# Patient Record
Sex: FEMALE | Race: WHITE | NOT HISPANIC OR LATINO | Employment: OTHER | URBAN - METROPOLITAN AREA
[De-identification: names, ages, dates, MRNs, and addresses within clinical notes are randomized per-mention and may not be internally consistent; named-entity substitution may affect disease eponyms.]

---

## 2023-06-16 ENCOUNTER — OFFICE VISIT (OUTPATIENT)
Dept: PRIMARY CARE | Facility: CLINIC | Age: 68
End: 2023-06-16
Payer: MEDICARE

## 2023-06-16 VITALS — SYSTOLIC BLOOD PRESSURE: 140 MMHG | WEIGHT: 149 LBS | BODY MASS INDEX: 25.58 KG/M2 | DIASTOLIC BLOOD PRESSURE: 100 MMHG

## 2023-06-16 DIAGNOSIS — I10 HYPERTENSION, UNSPECIFIED TYPE: Primary | ICD-10-CM

## 2023-06-16 DIAGNOSIS — J30.2 SEASONAL ALLERGIES: ICD-10-CM

## 2023-06-16 PROBLEM — R92.8 ABNORMAL MAMMOGRAM: Status: ACTIVE | Noted: 2023-06-16

## 2023-06-16 PROBLEM — J30.9 ALLERGIC RHINITIS: Status: ACTIVE | Noted: 2023-06-16

## 2023-06-16 PROBLEM — R42 DIZZINESS: Status: ACTIVE | Noted: 2023-06-16

## 2023-06-16 PROBLEM — R21 RASH: Status: ACTIVE | Noted: 2023-06-16

## 2023-06-16 PROCEDURE — 3077F SYST BP >= 140 MM HG: CPT | Performed by: STUDENT IN AN ORGANIZED HEALTH CARE EDUCATION/TRAINING PROGRAM

## 2023-06-16 PROCEDURE — 1159F MED LIST DOCD IN RCRD: CPT | Performed by: STUDENT IN AN ORGANIZED HEALTH CARE EDUCATION/TRAINING PROGRAM

## 2023-06-16 PROCEDURE — 3080F DIAST BP >= 90 MM HG: CPT | Performed by: STUDENT IN AN ORGANIZED HEALTH CARE EDUCATION/TRAINING PROGRAM

## 2023-06-16 PROCEDURE — 99213 OFFICE O/P EST LOW 20 MIN: CPT | Performed by: STUDENT IN AN ORGANIZED HEALTH CARE EDUCATION/TRAINING PROGRAM

## 2023-06-16 PROCEDURE — 1160F RVW MEDS BY RX/DR IN RCRD: CPT | Performed by: STUDENT IN AN ORGANIZED HEALTH CARE EDUCATION/TRAINING PROGRAM

## 2023-06-16 RX ORDER — LORATADINE 10 MG/1
10 TABLET ORAL DAILY PRN
Qty: 90 TABLET | Refills: 1 | Status: SHIPPED | OUTPATIENT
Start: 2023-06-16 | End: 2023-10-06 | Stop reason: SDUPTHER

## 2023-06-16 RX ORDER — LISINOPRIL 40 MG/1
40 TABLET ORAL DAILY
Qty: 90 TABLET | Refills: 1 | Status: SHIPPED | OUTPATIENT
Start: 2023-06-16 | End: 2023-12-15 | Stop reason: SDUPTHER

## 2023-06-16 RX ORDER — LISINOPRIL 40 MG/1
40 TABLET ORAL DAILY
COMMUNITY
End: 2023-06-16 | Stop reason: SDUPTHER

## 2023-06-16 RX ORDER — AMLODIPINE BESYLATE 2.5 MG/1
1 TABLET ORAL DAILY
COMMUNITY
Start: 2022-02-15 | End: 2023-12-14 | Stop reason: SDUPTHER

## 2023-06-16 RX ORDER — CLOBETASOL PROPIONATE 0.5 MG/G
CREAM TOPICAL
COMMUNITY
End: 2023-12-14 | Stop reason: SDUPTHER

## 2023-06-16 RX ORDER — IBUPROFEN AND DIPHENHYDRAMINE CITRATE 200; 38 MG/1; MG/1
TABLET, COATED ORAL
COMMUNITY
End: 2023-12-15 | Stop reason: ALTCHOICE

## 2023-06-16 RX ORDER — NYSTATIN 100000 [USP'U]/ML
SUSPENSION ORAL
COMMUNITY
Start: 2023-03-29 | End: 2023-12-15 | Stop reason: ALTCHOICE

## 2023-06-16 RX ORDER — TERBINAFINE HYDROCHLORIDE 250 MG/1
TABLET ORAL
COMMUNITY
Start: 2022-09-19 | End: 2023-12-15 | Stop reason: ALTCHOICE

## 2023-06-16 RX ORDER — LORATADINE 10 MG/1
10 TABLET ORAL DAILY PRN
COMMUNITY
End: 2023-06-16 | Stop reason: SDUPTHER

## 2023-06-16 RX ORDER — CLOBETASOL PROPIONATE 0.46 MG/ML
SOLUTION TOPICAL
COMMUNITY
Start: 2022-02-01

## 2023-06-16 RX ORDER — TRIAMCINOLONE ACETONIDE 5 MG/G
CREAM TOPICAL
COMMUNITY
Start: 2020-01-23 | End: 2023-12-15 | Stop reason: ALTCHOICE

## 2023-06-16 RX ORDER — BETAMETHASONE VALERATE 1 MG/G
CREAM TOPICAL
COMMUNITY
End: 2023-10-29

## 2023-06-16 RX ORDER — IBUPROFEN 400 MG/1
400 TABLET ORAL
COMMUNITY
End: 2024-04-03 | Stop reason: ALTCHOICE

## 2023-06-16 RX ORDER — FLUTICASONE PROPIONATE 50 MCG
SPRAY, SUSPENSION (ML) NASAL
COMMUNITY
Start: 2018-09-11 | End: 2024-04-06 | Stop reason: HOSPADM

## 2023-06-16 NOTE — PROGRESS NOTES
Follow up and med refill    Subjective   Patient ID: Toma Lane is a 67 y.o. female who presents for Follow-up and Med Refill.    HPI presents for follow-up medication refill.  Overall has been doing well.  Had a plan with her dermatologist earlier this week.  Has also had some dental work done including new dentures on the bottom and new fittings for the top.    Review of Systems  8 point review of systems is otherwise negative unless mentioned on HPI      Objective   BP (!) 140/100   Wt 67.6 kg (149 lb)   BMI 25.58 kg/m²     Physical Exam  General: No acute distress  HEENT: EOMI  CV: Regular rate and rhythm, normal S1 and S2, no murmurs  Pulm: Clear to auscultation bilaterally, no wheezings, rales or rhonchi  Abd: Nondistended  MSK: areas of erythema with plaque on forearms and legs  Skin: No rashes   Lymphatic: No lymphadenopathy      Assessment/Plan       #HTN  -Continue lisinopril 40mg daily  -Continue amlodipine 2.5mg daily, discussed may need to increase at next visit if blood pressure remains little bit high, to start monitoring blood pressure again at home.     #Plaque psoriasis  -Follows with dermatology, was initially started on Otezla that was discontinued for side effects, had been considered for injectable medications, were cost prohibitive. Currently on 2 different steroid creams that are alternated every 2 weeks. Has had some improvement     #Raynaud's  -Has had some improvement since starting amlodipine, has noted some LE edema although has not been bothersome enough to stop the amlodipine     #H/O hand numbness with positive phalen's test  -Offered to order EMG when is more bothersome      #Tobacco use  -Discussed decreasing/quitting, still working on decreasing use over time     #Rhinorrhea  -Continue flonase and loratadine.      #Health maintenance  -Last colonoscopy at age 58, told no issues and due to repeat at 68  -Mammogram/ultrasound 2/2023  -Follows with GYN  -Received Covid vaccine  and booster  -Previously recommended Pneumovax, will consider  -Recommended shingrix  -Routine labs ordered     RTC 6 months      This note was dictated by speech recognition. Minor errors in transcription may be present.

## 2023-10-06 DIAGNOSIS — J30.2 SEASONAL ALLERGIES: ICD-10-CM

## 2023-10-06 RX ORDER — LORATADINE 10 MG/1
10 TABLET ORAL DAILY PRN
Qty: 90 TABLET | Refills: 0 | Status: SHIPPED | OUTPATIENT
Start: 2023-10-06 | End: 2023-12-15 | Stop reason: SDUPTHER

## 2023-10-28 DIAGNOSIS — L40.0 PLAQUE PSORIASIS: Primary | ICD-10-CM

## 2023-10-29 RX ORDER — BETAMETHASONE VALERATE 1 MG/G
CREAM TOPICAL
Qty: 45 G | Refills: 3 | Status: SHIPPED | OUTPATIENT
Start: 2023-10-29 | End: 2023-12-14 | Stop reason: SDUPTHER

## 2023-11-28 DIAGNOSIS — L21.9 SEBORRHEIC DERMATITIS: ICD-10-CM

## 2023-12-10 RX ORDER — CLOBETASOL PROPIONATE 0.5 MG/G
CREAM TOPICAL
Qty: 60 G | Refills: 3 | OUTPATIENT
Start: 2023-12-10

## 2023-12-11 ENCOUNTER — APPOINTMENT (OUTPATIENT)
Dept: DERMATOLOGY | Facility: CLINIC | Age: 68
End: 2023-12-11
Payer: MEDICARE

## 2023-12-14 ENCOUNTER — OFFICE VISIT (OUTPATIENT)
Dept: DERMATOLOGY | Facility: CLINIC | Age: 68
End: 2023-12-14
Payer: MEDICARE

## 2023-12-14 DIAGNOSIS — L40.0 PLAQUE PSORIASIS: ICD-10-CM

## 2023-12-14 PROBLEM — L72.11 PILAR CYST: Status: ACTIVE | Noted: 2023-06-12

## 2023-12-14 PROBLEM — D48.5 NEOPLASM OF UNCERTAIN BEHAVIOR OF SKIN: Status: ACTIVE | Noted: 2023-06-12

## 2023-12-14 PROBLEM — L65.0 TELOGEN EFFLUVIUM: Status: ACTIVE | Noted: 2023-06-12

## 2023-12-14 PROBLEM — B35.1 TINEA UNGUIUM: Status: ACTIVE | Noted: 2023-06-12

## 2023-12-14 PROBLEM — L28.0 LICHEN SIMPLEX CHRONICUS: Status: ACTIVE | Noted: 2023-06-12

## 2023-12-14 PROCEDURE — 99212 OFFICE O/P EST SF 10 MIN: CPT | Performed by: SPECIALIST

## 2023-12-14 PROCEDURE — 1159F MED LIST DOCD IN RCRD: CPT | Performed by: SPECIALIST

## 2023-12-14 PROCEDURE — 1160F RVW MEDS BY RX/DR IN RCRD: CPT | Performed by: SPECIALIST

## 2023-12-14 RX ORDER — LISINOPRIL 10 MG/1
10 TABLET ORAL
COMMUNITY
End: 2023-12-15 | Stop reason: ALTCHOICE

## 2023-12-14 RX ORDER — GUSELKUMAB 100 MG/ML
INJECTION SUBCUTANEOUS
COMMUNITY
Start: 2022-02-18 | End: 2023-12-15 | Stop reason: ALTCHOICE

## 2023-12-14 RX ORDER — CLOBETASOL PROPIONATE 0.5 MG/G
CREAM TOPICAL
Qty: 60 G | Refills: 1 | Status: SHIPPED | OUTPATIENT
Start: 2023-12-14

## 2023-12-14 RX ORDER — BETAMETHASONE VALERATE 1 MG/G
CREAM TOPICAL
Qty: 45 G | Refills: 3 | Status: SHIPPED | OUTPATIENT
Start: 2023-12-14

## 2023-12-14 RX ORDER — AMLODIPINE BESYLATE 2.5 MG/1
2.5 TABLET ORAL DAILY
Qty: 90 TABLET | Refills: 3 | Status: SHIPPED | OUTPATIENT
Start: 2023-12-14

## 2023-12-14 NOTE — PROGRESS NOTES
Subjective     Toma Lane is a 68 y.o. female who presents for the following: Follow-up (PSO /Taking Clobetasol Cream , Betamethasone Valerate/Amlodipine 2.5 every day).     Review of Systems:  No other skin or systemic complaints other than what is documented elsewhere in the note.    The following portions of the chart were reviewed this encounter and updated as appropriate:          Skin Cancer History  No skin cancer on file.      Specialty Problems          Dermatology Problems    Rash        Objective   Well appearing patient in no apparent distress; mood and affect are within normal limits.    A focused skin examination was performed. All findings within normal limits unless otherwise noted below.    Assessment/Plan   1. Plaque psoriasis    Related Medications  betamethasone valerate (Valisone) 0.1 % cream  APPLY TWICE A DAY TOPICALLY TO PLAQUES ON BODY

## 2023-12-14 NOTE — PROGRESS NOTES
Subjective   HPI: Toma Lane is a 68 y.o. female is here for evaluation and treatment of psoriasis and vascular spasms of her fingers..     ROS: No other skin or systemic complaints other than what is documented elsewhere in the note.    ALLERGIES: Patient has no known allergies.    SOCIAL:  reports that she has been smoking cigarettes. She uses smokeless tobacco. She reports current alcohol use. She reports that she does not use drugs.    Objective     Description: Patient psoriasis involving her hands is under excellent control today.  No complaints are offered regarding her fingers.    Assessment/Plan   1. Plaque psoriasis    Related Medications  betamethasone valerate (Valisone) 0.1 % cream  APPLY TWICE A DAY TOPICALLY TO PLAQUES ON BODY    amLODIPine (Norvasc) 2.5 mg tablet  Take 1 tablet (2.5 mg) by mouth once daily.    clobetasol (Temovate) 0.05 % cream  APPLY TWICE A DAY TOPICALLY TO ALL PLAQUE AREAS-USE FOR 2 WKS,THEN STEP DOWN TO BETAMETHASONE CREAM         FOLLOW UP: 6 months or sooner if necessary.    The patient was encouraged to contact me with any further questions or concerns.  Dima Hinkle MD  12/14/2023

## 2023-12-15 ENCOUNTER — OFFICE VISIT (OUTPATIENT)
Dept: PRIMARY CARE | Facility: CLINIC | Age: 68
End: 2023-12-15
Payer: MEDICARE

## 2023-12-15 ENCOUNTER — LAB (OUTPATIENT)
Dept: LAB | Facility: LAB | Age: 68
End: 2023-12-15
Payer: MEDICARE

## 2023-12-15 VITALS — WEIGHT: 150 LBS | BODY MASS INDEX: 25.75 KG/M2 | SYSTOLIC BLOOD PRESSURE: 131 MMHG | DIASTOLIC BLOOD PRESSURE: 85 MMHG

## 2023-12-15 DIAGNOSIS — Z12.11 SCREENING FOR COLORECTAL CANCER: ICD-10-CM

## 2023-12-15 DIAGNOSIS — Z00.00 HEALTHCARE MAINTENANCE: Primary | ICD-10-CM

## 2023-12-15 DIAGNOSIS — Z00.00 MEDICARE ANNUAL WELLNESS VISIT, SUBSEQUENT: ICD-10-CM

## 2023-12-15 DIAGNOSIS — Z12.12 SCREENING FOR COLORECTAL CANCER: ICD-10-CM

## 2023-12-15 DIAGNOSIS — I10 HYPERTENSION, UNSPECIFIED TYPE: ICD-10-CM

## 2023-12-15 DIAGNOSIS — Z00.00 HEALTHCARE MAINTENANCE: ICD-10-CM

## 2023-12-15 DIAGNOSIS — J30.2 SEASONAL ALLERGIES: ICD-10-CM

## 2023-12-15 DIAGNOSIS — Z00.00 ROUTINE GENERAL MEDICAL EXAMINATION AT HEALTH CARE FACILITY: ICD-10-CM

## 2023-12-15 LAB
ALBUMIN SERPL BCP-MCNC: 3.9 G/DL (ref 3.4–5)
ALP SERPL-CCNC: 88 U/L (ref 33–136)
ALT SERPL W P-5'-P-CCNC: 15 U/L (ref 7–45)
ANION GAP SERPL CALC-SCNC: 10 MMOL/L (ref 10–20)
AST SERPL W P-5'-P-CCNC: 19 U/L (ref 9–39)
BILIRUB SERPL-MCNC: 0.3 MG/DL (ref 0–1.2)
BUN SERPL-MCNC: 19 MG/DL (ref 6–23)
CALCIUM SERPL-MCNC: 10 MG/DL (ref 8.6–10.6)
CHLORIDE SERPL-SCNC: 106 MMOL/L (ref 98–107)
CHOLEST SERPL-MCNC: 176 MG/DL (ref 0–199)
CHOLESTEROL/HDL RATIO: 2.7
CO2 SERPL-SCNC: 31 MMOL/L (ref 21–32)
CREAT SERPL-MCNC: 1.2 MG/DL (ref 0.5–1.05)
ERYTHROCYTE [DISTWIDTH] IN BLOOD BY AUTOMATED COUNT: 14.5 % (ref 11.5–14.5)
GFR SERPL CREATININE-BSD FRML MDRD: 49 ML/MIN/1.73M*2
GLUCOSE SERPL-MCNC: 102 MG/DL (ref 74–99)
HCT VFR BLD AUTO: 39.6 % (ref 36–46)
HDLC SERPL-MCNC: 65.7 MG/DL
HGB BLD-MCNC: 12.8 G/DL (ref 12–16)
IRON SATN MFR SERPL: 22 % (ref 25–45)
IRON SERPL-MCNC: 74 UG/DL (ref 35–150)
LDLC SERPL CALC-MCNC: 94 MG/DL
MCH RBC QN AUTO: 32.9 PG (ref 26–34)
MCHC RBC AUTO-ENTMCNC: 32.3 G/DL (ref 32–36)
MCV RBC AUTO: 102 FL (ref 80–100)
NON HDL CHOLESTEROL: 110 MG/DL (ref 0–149)
NRBC BLD-RTO: 0 /100 WBCS (ref 0–0)
PLATELET # BLD AUTO: 454 X10*3/UL (ref 150–450)
POTASSIUM SERPL-SCNC: 4.3 MMOL/L (ref 3.5–5.3)
PROT SERPL-MCNC: 7.2 G/DL (ref 6.4–8.2)
RBC # BLD AUTO: 3.89 X10*6/UL (ref 4–5.2)
SODIUM SERPL-SCNC: 143 MMOL/L (ref 136–145)
TIBC SERPL-MCNC: 341 UG/DL (ref 240–445)
TRIGL SERPL-MCNC: 80 MG/DL (ref 0–149)
TSH SERPL-ACNC: 1.61 MIU/L (ref 0.44–3.98)
UIBC SERPL-MCNC: 267 UG/DL (ref 110–370)
VLDL: 16 MG/DL (ref 0–40)
WBC # BLD AUTO: 6.1 X10*3/UL (ref 4.4–11.3)

## 2023-12-15 PROCEDURE — 83550 IRON BINDING TEST: CPT

## 2023-12-15 PROCEDURE — 80061 LIPID PANEL: CPT

## 2023-12-15 PROCEDURE — 1159F MED LIST DOCD IN RCRD: CPT | Performed by: STUDENT IN AN ORGANIZED HEALTH CARE EDUCATION/TRAINING PROGRAM

## 2023-12-15 PROCEDURE — 1170F FXNL STATUS ASSESSED: CPT | Performed by: STUDENT IN AN ORGANIZED HEALTH CARE EDUCATION/TRAINING PROGRAM

## 2023-12-15 PROCEDURE — 85027 COMPLETE CBC AUTOMATED: CPT

## 2023-12-15 PROCEDURE — 80053 COMPREHEN METABOLIC PANEL: CPT

## 2023-12-15 PROCEDURE — 1160F RVW MEDS BY RX/DR IN RCRD: CPT | Performed by: STUDENT IN AN ORGANIZED HEALTH CARE EDUCATION/TRAINING PROGRAM

## 2023-12-15 PROCEDURE — 3079F DIAST BP 80-89 MM HG: CPT | Performed by: STUDENT IN AN ORGANIZED HEALTH CARE EDUCATION/TRAINING PROGRAM

## 2023-12-15 PROCEDURE — 36415 COLL VENOUS BLD VENIPUNCTURE: CPT

## 2023-12-15 PROCEDURE — 84443 ASSAY THYROID STIM HORMONE: CPT

## 2023-12-15 PROCEDURE — G0439 PPPS, SUBSEQ VISIT: HCPCS | Performed by: STUDENT IN AN ORGANIZED HEALTH CARE EDUCATION/TRAINING PROGRAM

## 2023-12-15 PROCEDURE — 99213 OFFICE O/P EST LOW 20 MIN: CPT | Performed by: STUDENT IN AN ORGANIZED HEALTH CARE EDUCATION/TRAINING PROGRAM

## 2023-12-15 PROCEDURE — 83540 ASSAY OF IRON: CPT

## 2023-12-15 PROCEDURE — 3075F SYST BP GE 130 - 139MM HG: CPT | Performed by: STUDENT IN AN ORGANIZED HEALTH CARE EDUCATION/TRAINING PROGRAM

## 2023-12-15 RX ORDER — LORATADINE 10 MG/1
10 TABLET ORAL DAILY PRN
Qty: 90 TABLET | Refills: 1 | Status: SHIPPED | OUTPATIENT
Start: 2023-12-15

## 2023-12-15 RX ORDER — LISINOPRIL 40 MG/1
40 TABLET ORAL DAILY
Qty: 90 TABLET | Refills: 1 | Status: SHIPPED | OUTPATIENT
Start: 2023-12-15

## 2023-12-15 ASSESSMENT — ACTIVITIES OF DAILY LIVING (ADL)
MANAGING_FINANCES: INDEPENDENT
GROCERY_SHOPPING: INDEPENDENT
BATHING: INDEPENDENT
DOING_HOUSEWORK: INDEPENDENT
DRESSING: INDEPENDENT
TAKING_MEDICATION: INDEPENDENT

## 2023-12-15 ASSESSMENT — PATIENT HEALTH QUESTIONNAIRE - PHQ9
2. FEELING DOWN, DEPRESSED OR HOPELESS: SEVERAL DAYS
1. LITTLE INTEREST OR PLEASURE IN DOING THINGS: NOT AT ALL
10. IF YOU CHECKED OFF ANY PROBLEMS, HOW DIFFICULT HAVE THESE PROBLEMS MADE IT FOR YOU TO DO YOUR WORK, TAKE CARE OF THINGS AT HOME, OR GET ALONG WITH OTHER PEOPLE: NOT DIFFICULT AT ALL
SUM OF ALL RESPONSES TO PHQ9 QUESTIONS 1 AND 2: 1

## 2023-12-15 NOTE — PROGRESS NOTES
Subjective   Reason for Visit: Toma Lane is an 68 y.o. female here for a Medicare Wellness visit.          Reviewed all medications by prescribing practitioner or clinical pharmacist (such as prescriptions, OTCs, herbal therapies and supplements) and documented in the medical record.    HPI    Presents for follow-up, Medicare wellness visit.  Reports has been doing well.  Has been going through limited increase in stress in regards to family dynamics.  Blood pressure today was 131/85, she was little bit surprised that it was in a good range given the recent increase in stress.  Stays active.    Patient Care Team:  William Simeon MD as PCP - General     Review of Systems    8 point review of systems is otherwise negative unless mentioned on HPI      Objective   Vitals:  /85   Wt 68 kg (150 lb)   BMI 25.75 kg/m²       Physical Exam    General: No acute distress  HEENT: EOMI  CV: Regular rate and rhythm, normal S1 and S2, no murmurs  Pulm: Clear to auscultation bilaterally, no wheezings, rales or rhonchi  Abd: Nondistended  MSK: 5/5 strength in all extremities  Lymphatic: No lymphadenopathy      Assessment/Plan   Problem List Items Addressed This Visit          Cardiac and Vasculature    Hypertension    Relevant Medications    lisinopril 40 mg tablet     Other Visit Diagnoses       Healthcare maintenance    -  Primary    Relevant Orders    CBC    Comprehensive Metabolic Panel    Lipid Panel    TSH with reflex to Free T4 if abnormal    Iron and TIBC    Seasonal allergies        Relevant Medications    loratadine (Claritin) 10 mg tablet    Screening for colorectal cancer        Relevant Orders    CBC    Routine general medical examination at health care facility              #HTN  -Continue lisinopril 40mg daily  -Continue amlodipine 2.5mg daily     #Plaque psoriasis  -Follows with dermatology, was initially started on Otezla that was discontinued for side effects, had been considered for injectable  medications, were cost prohibitive. Currently on 2 different steroid creams that are alternated every 2 weeks. Has had some improvement     #Raynaud's  -Has had some improvement since starting amlodipine, has noted some LE edema although has not been bothersome enough to stop the amlodipine     #H/O hand numbness with positive phalen's test  -Offered to order EMG when is more bothersome      #Tobacco use  -Discussed decreasing/quitting, still working on decreasing use over time     #Rhinorrhea  -Continue flonase and loratadine.      #Health maintenance  -Last colonoscopy at age 58, told no issues and due to repeat at 68, discussed 12/2023, would like to consider in early 2024  -Mammogram/ultrasound 2/2023  -Follows with GYN  -Received Covid vaccine and booster  -Previously recommended Pneumovax, will consider  -Recommended shingrix  -Routine labs ordered     RTC 6 months      This note was dictated by speech recognition. Minor errors in transcription may be present.

## 2023-12-15 NOTE — PROGRESS NOTES
Follow up and med refill and wellness visit    Subjective   Reason for Visit: Toma Lane is an 68 y.o. female here for a Medicare Wellness visit.          Reviewed all medications by prescribing practitioner or clinical pharmacist (such as prescriptions, OTCs, herbal therapies and supplements) and documented in the medical record.    HPI    Patient Care Team:  William Simeon MD as PCP - General     Review of Systems    Objective   Vitals:  There were no vitals taken for this visit.      Physical Exam    Assessment/Plan   Problem List Items Addressed This Visit       Hypertension     Other Visit Diagnoses       Seasonal allergies

## 2023-12-22 ENCOUNTER — TELEPHONE (OUTPATIENT)
Dept: PRIMARY CARE | Facility: CLINIC | Age: 68
End: 2023-12-22
Payer: MEDICARE

## 2023-12-22 NOTE — TELEPHONE ENCOUNTER
----- Message from William Simeon MD sent at 12/22/2023  7:48 AM EST -----  Please let Toma know that all labs are similar to prior. Iron level was just slightly before reference range. Electrolytes, liver function, thyroid function were all normal. Kidney function slightly above reference range which could be due to fasting labs in the morning. Platelets also slightly above normal range. All of these will just be monitored for now and no changes in medications needed at this time, thanks

## 2024-03-29 ENCOUNTER — OFFICE VISIT (OUTPATIENT)
Dept: PRIMARY CARE | Facility: CLINIC | Age: 69
End: 2024-03-29
Payer: MEDICARE

## 2024-03-29 VITALS — SYSTOLIC BLOOD PRESSURE: 112 MMHG | DIASTOLIC BLOOD PRESSURE: 84 MMHG | BODY MASS INDEX: 24.37 KG/M2 | WEIGHT: 142 LBS

## 2024-03-29 DIAGNOSIS — F41.9 ANXIETY: Primary | ICD-10-CM

## 2024-03-29 PROCEDURE — 99214 OFFICE O/P EST MOD 30 MIN: CPT | Performed by: STUDENT IN AN ORGANIZED HEALTH CARE EDUCATION/TRAINING PROGRAM

## 2024-03-29 PROCEDURE — 3074F SYST BP LT 130 MM HG: CPT | Performed by: STUDENT IN AN ORGANIZED HEALTH CARE EDUCATION/TRAINING PROGRAM

## 2024-03-29 PROCEDURE — 3079F DIAST BP 80-89 MM HG: CPT | Performed by: STUDENT IN AN ORGANIZED HEALTH CARE EDUCATION/TRAINING PROGRAM

## 2024-03-29 PROCEDURE — 1159F MED LIST DOCD IN RCRD: CPT | Performed by: STUDENT IN AN ORGANIZED HEALTH CARE EDUCATION/TRAINING PROGRAM

## 2024-03-29 RX ORDER — LORAZEPAM 0.5 MG/1
0.5 TABLET ORAL DAILY PRN
Qty: 10 TABLET | Refills: 0 | Status: SHIPPED | OUTPATIENT
Start: 2024-03-29 | End: 2024-04-08

## 2024-03-29 RX ORDER — ESCITALOPRAM OXALATE 10 MG/1
10 TABLET ORAL DAILY
Qty: 90 TABLET | Refills: 0 | Status: SHIPPED | OUTPATIENT
Start: 2024-03-29 | End: 2024-04-15 | Stop reason: WASHOUT

## 2024-03-29 NOTE — PROGRESS NOTES
Follow up and needs help, major personal issues with daughter and  committed suicide.    Subjective   Patient ID: Toma Lane is a 68 y.o. female who presents for Follow-up and not doing well since   (Committed suicide).    HPI     Presents for followup.  committed suicide on Willard day of 2023 by shotgun. Had been having family disagreements with daughter including about financials and responsibilities. Has been having more anxiety, still having the same problems with daughter.      Review of Systems    8 point review of systems is otherwise negative unless mentioned on HPI      Objective   /84   Wt 64.4 kg (142 lb)   BMI 24.37 kg/m²     Physical Exam    No physical exam performed    Assessment/Plan       #Grieving  #Anxiety  -Start lexapro 10mg daily  -Provided very brief course or lorazepam as needed. [place] reviewed.     #HTN  -Continue lisinopril 40mg daily  -Continue amlodipine 2.5mg daily     #Plaque psoriasis  -Follows with dermatology, was initially started on Otezla that was discontinued for side effects, had been considered for injectable medications, were cost prohibitive. Currently on 2 different steroid creams that are alternated every 2 weeks. Has had some improvement     #Raynaud's  -Has had some improvement since starting amlodipine, has noted some LE edema although has not been bothersome enough to stop the amlodipine     #H/O hand numbness with positive phalen's test  -Offered to order EMG when is more bothersome      #Tobacco use  -Discussed decreasing/quitting, still working on decreasing use over time     #Rhinorrhea  -Continue flonase and loratadine.      #Health maintenance  -Last colonoscopy at age 58, told no issues and due to repeat at 68, discussed 2023, would like to consider in early   -Mammogram/ultrasound 2023  -Follows with GYN  -Received Covid vaccine and booster  -Previously recommended Pneumovax, will consider  -Recommended  shingrix  -Routine labs ordered     RTC 6 months      This note was dictated by speech recognition. Minor errors in transcription may be present.

## 2024-04-01 PROCEDURE — 96361 HYDRATE IV INFUSION ADD-ON: CPT

## 2024-04-01 PROCEDURE — 96375 TX/PRO/DX INJ NEW DRUG ADDON: CPT

## 2024-04-01 PROCEDURE — 99285 EMERGENCY DEPT VISIT HI MDM: CPT | Mod: 25

## 2024-04-01 PROCEDURE — 96374 THER/PROPH/DIAG INJ IV PUSH: CPT

## 2024-04-01 ASSESSMENT — COLUMBIA-SUICIDE SEVERITY RATING SCALE - C-SSRS
2. HAVE YOU ACTUALLY HAD ANY THOUGHTS OF KILLING YOURSELF?: NO
6. HAVE YOU EVER DONE ANYTHING, STARTED TO DO ANYTHING, OR PREPARED TO DO ANYTHING TO END YOUR LIFE?: NO
1. IN THE PAST MONTH, HAVE YOU WISHED YOU WERE DEAD OR WISHED YOU COULD GO TO SLEEP AND NOT WAKE UP?: NO

## 2024-04-02 ENCOUNTER — HOSPITAL ENCOUNTER (EMERGENCY)
Facility: HOSPITAL | Age: 69
Discharge: HOME | DRG: 373 | End: 2024-04-02
Attending: STUDENT IN AN ORGANIZED HEALTH CARE EDUCATION/TRAINING PROGRAM
Payer: MEDICARE

## 2024-04-02 ENCOUNTER — APPOINTMENT (OUTPATIENT)
Dept: RADIOLOGY | Facility: HOSPITAL | Age: 69
DRG: 373 | End: 2024-04-02
Payer: MEDICARE

## 2024-04-02 ENCOUNTER — APPOINTMENT (OUTPATIENT)
Dept: CARDIOLOGY | Facility: HOSPITAL | Age: 69
DRG: 373 | End: 2024-04-02
Payer: MEDICARE

## 2024-04-02 VITALS
HEIGHT: 63 IN | OXYGEN SATURATION: 97 % | RESPIRATION RATE: 16 BRPM | BODY MASS INDEX: 24.8 KG/M2 | SYSTOLIC BLOOD PRESSURE: 112 MMHG | WEIGHT: 140 LBS | HEART RATE: 72 BPM | DIASTOLIC BLOOD PRESSURE: 67 MMHG | TEMPERATURE: 97.9 F

## 2024-04-02 DIAGNOSIS — K52.9 COLITIS: ICD-10-CM

## 2024-04-02 DIAGNOSIS — R19.7 DIARRHEA, UNSPECIFIED TYPE: ICD-10-CM

## 2024-04-02 DIAGNOSIS — K92.1 BLOODY STOOLS: Primary | ICD-10-CM

## 2024-04-02 LAB
ALBUMIN SERPL BCP-MCNC: 3.6 G/DL (ref 3.4–5)
ALP SERPL-CCNC: 71 U/L (ref 33–136)
ALT SERPL W P-5'-P-CCNC: 11 U/L (ref 7–45)
ANION GAP SERPL CALC-SCNC: 14 MMOL/L (ref 10–20)
APPEARANCE UR: CLEAR
APTT PPP: 37 SECONDS (ref 27–38)
AST SERPL W P-5'-P-CCNC: 13 U/L (ref 9–39)
BASOPHILS # BLD MANUAL: 0.09 X10*3/UL (ref 0–0.1)
BASOPHILS NFR BLD MANUAL: 1 %
BILIRUB SERPL-MCNC: 0.3 MG/DL (ref 0–1.2)
BILIRUB UR STRIP.AUTO-MCNC: NEGATIVE MG/DL
BUN SERPL-MCNC: 18 MG/DL (ref 6–23)
BURR CELLS BLD QL SMEAR: ABNORMAL
CALCIUM SERPL-MCNC: 9.4 MG/DL (ref 8.6–10.3)
CHLORIDE SERPL-SCNC: 109 MMOL/L (ref 98–107)
CO2 SERPL-SCNC: 19 MMOL/L (ref 21–32)
COLOR UR: ABNORMAL
CREAT SERPL-MCNC: 0.95 MG/DL (ref 0.5–1.05)
EGFRCR SERPLBLD CKD-EPI 2021: 65 ML/MIN/1.73M*2
EOSINOPHIL # BLD MANUAL: 0.19 X10*3/UL (ref 0–0.7)
EOSINOPHIL NFR BLD MANUAL: 2 %
ERYTHROCYTE [DISTWIDTH] IN BLOOD BY AUTOMATED COUNT: 14.2 % (ref 11.5–14.5)
FLUAV RNA RESP QL NAA+PROBE: NOT DETECTED
FLUBV RNA RESP QL NAA+PROBE: NOT DETECTED
GLUCOSE SERPL-MCNC: 88 MG/DL (ref 74–99)
GLUCOSE UR STRIP.AUTO-MCNC: NEGATIVE MG/DL
HCT VFR BLD AUTO: 38.7 % (ref 36–46)
HGB BLD-MCNC: 13.1 G/DL (ref 12–16)
IMM GRANULOCYTES # BLD AUTO: 0.1 X10*3/UL (ref 0–0.7)
IMM GRANULOCYTES NFR BLD AUTO: 1.1 % (ref 0–0.9)
INR PPP: 1 (ref 0.9–1.1)
KETONES UR STRIP.AUTO-MCNC: ABNORMAL MG/DL
LEUKOCYTE ESTERASE UR QL STRIP.AUTO: NEGATIVE
LIPASE SERPL-CCNC: 10 U/L (ref 9–82)
LYMPHOCYTES # BLD MANUAL: 3.2 X10*3/UL (ref 1.2–4.8)
LYMPHOCYTES NFR BLD MANUAL: 34 %
MAGNESIUM SERPL-MCNC: 1.68 MG/DL (ref 1.6–2.4)
MCH RBC QN AUTO: 33.2 PG (ref 26–34)
MCHC RBC AUTO-ENTMCNC: 33.9 G/DL (ref 32–36)
MCV RBC AUTO: 98 FL (ref 80–100)
MONOCYTES # BLD MANUAL: 1.41 X10*3/UL (ref 0.1–1)
MONOCYTES NFR BLD MANUAL: 15 %
NEUTROPHILS # BLD MANUAL: 4.52 X10*3/UL (ref 1.2–7.7)
NEUTS BAND # BLD MANUAL: 1.32 X10*3/UL (ref 0–0.7)
NEUTS BAND NFR BLD MANUAL: 14 %
NEUTS SEG # BLD MANUAL: 3.2 X10*3/UL (ref 1.2–7)
NEUTS SEG NFR BLD MANUAL: 34 %
NITRITE UR QL STRIP.AUTO: NEGATIVE
NRBC BLD-RTO: 0 /100 WBCS (ref 0–0)
PH UR STRIP.AUTO: 5 [PH]
PLATELET # BLD AUTO: 524 X10*3/UL (ref 150–450)
PLATELET CLUMP BLD QL SMEAR: PRESENT
POLYCHROMASIA BLD QL SMEAR: ABNORMAL
POTASSIUM SERPL-SCNC: 3.5 MMOL/L (ref 3.5–5.3)
PROT SERPL-MCNC: 7.1 G/DL (ref 6.4–8.2)
PROT UR STRIP.AUTO-MCNC: NEGATIVE MG/DL
PROTHROMBIN TIME: 11.7 SECONDS (ref 9.8–12.8)
RBC # BLD AUTO: 3.94 X10*6/UL (ref 4–5.2)
RBC # UR STRIP.AUTO: ABNORMAL /UL
RBC #/AREA URNS AUTO: ABNORMAL /HPF
RBC MORPH BLD: ABNORMAL
SARS-COV-2 RNA RESP QL NAA+PROBE: NOT DETECTED
SODIUM SERPL-SCNC: 138 MMOL/L (ref 136–145)
SP GR UR STRIP.AUTO: 1.03
TOTAL CELLS COUNTED BLD: 100
UROBILINOGEN UR STRIP.AUTO-MCNC: <2 MG/DL
WBC # BLD AUTO: 9.4 X10*3/UL (ref 4.4–11.3)
WBC #/AREA URNS AUTO: ABNORMAL /HPF

## 2024-04-02 PROCEDURE — 36415 COLL VENOUS BLD VENIPUNCTURE: CPT | Performed by: STUDENT IN AN ORGANIZED HEALTH CARE EDUCATION/TRAINING PROGRAM

## 2024-04-02 PROCEDURE — 85027 COMPLETE CBC AUTOMATED: CPT | Performed by: STUDENT IN AN ORGANIZED HEALTH CARE EDUCATION/TRAINING PROGRAM

## 2024-04-02 PROCEDURE — 81001 URINALYSIS AUTO W/SCOPE: CPT | Performed by: STUDENT IN AN ORGANIZED HEALTH CARE EDUCATION/TRAINING PROGRAM

## 2024-04-02 PROCEDURE — 74177 CT ABD & PELVIS W/CONTRAST: CPT | Performed by: RADIOLOGY

## 2024-04-02 PROCEDURE — 2550000001 HC RX 255 CONTRASTS: Performed by: STUDENT IN AN ORGANIZED HEALTH CARE EDUCATION/TRAINING PROGRAM

## 2024-04-02 PROCEDURE — 2500000004 HC RX 250 GENERAL PHARMACY W/ HCPCS (ALT 636 FOR OP/ED): Performed by: STUDENT IN AN ORGANIZED HEALTH CARE EDUCATION/TRAINING PROGRAM

## 2024-04-02 PROCEDURE — 83690 ASSAY OF LIPASE: CPT | Performed by: STUDENT IN AN ORGANIZED HEALTH CARE EDUCATION/TRAINING PROGRAM

## 2024-04-02 PROCEDURE — 85007 BL SMEAR W/DIFF WBC COUNT: CPT | Performed by: STUDENT IN AN ORGANIZED HEALTH CARE EDUCATION/TRAINING PROGRAM

## 2024-04-02 PROCEDURE — 93005 ELECTROCARDIOGRAM TRACING: CPT

## 2024-04-02 PROCEDURE — 74177 CT ABD & PELVIS W/CONTRAST: CPT

## 2024-04-02 PROCEDURE — 80053 COMPREHEN METABOLIC PANEL: CPT | Performed by: STUDENT IN AN ORGANIZED HEALTH CARE EDUCATION/TRAINING PROGRAM

## 2024-04-02 PROCEDURE — 85610 PROTHROMBIN TIME: CPT | Performed by: STUDENT IN AN ORGANIZED HEALTH CARE EDUCATION/TRAINING PROGRAM

## 2024-04-02 PROCEDURE — 2500000001 HC RX 250 WO HCPCS SELF ADMINISTERED DRUGS (ALT 637 FOR MEDICARE OP): Performed by: EMERGENCY MEDICINE

## 2024-04-02 PROCEDURE — 87636 SARSCOV2 & INF A&B AMP PRB: CPT | Performed by: STUDENT IN AN ORGANIZED HEALTH CARE EDUCATION/TRAINING PROGRAM

## 2024-04-02 PROCEDURE — 83735 ASSAY OF MAGNESIUM: CPT | Performed by: STUDENT IN AN ORGANIZED HEALTH CARE EDUCATION/TRAINING PROGRAM

## 2024-04-02 RX ORDER — METRONIDAZOLE 500 MG/1
500 TABLET ORAL 3 TIMES DAILY
Qty: 21 TABLET | Refills: 0 | Status: SHIPPED | OUTPATIENT
Start: 2024-04-02 | End: 2024-04-06 | Stop reason: HOSPADM

## 2024-04-02 RX ORDER — MORPHINE SULFATE 4 MG/ML
4 INJECTION, SOLUTION INTRAMUSCULAR; INTRAVENOUS ONCE
Status: COMPLETED | OUTPATIENT
Start: 2024-04-02 | End: 2024-04-02

## 2024-04-02 RX ORDER — CIPROFLOXACIN 500 MG/1
500 TABLET ORAL ONCE
Status: COMPLETED | OUTPATIENT
Start: 2024-04-02 | End: 2024-04-02

## 2024-04-02 RX ORDER — ONDANSETRON HYDROCHLORIDE 2 MG/ML
4 INJECTION, SOLUTION INTRAVENOUS ONCE
Status: COMPLETED | OUTPATIENT
Start: 2024-04-02 | End: 2024-04-02

## 2024-04-02 RX ORDER — METRONIDAZOLE 500 MG/1
500 TABLET ORAL ONCE
Status: COMPLETED | OUTPATIENT
Start: 2024-04-02 | End: 2024-04-02

## 2024-04-02 RX ORDER — CIPROFLOXACIN 500 MG/1
500 TABLET ORAL 2 TIMES DAILY
Qty: 14 TABLET | Refills: 0 | Status: SHIPPED | OUTPATIENT
Start: 2024-04-02 | End: 2024-04-06 | Stop reason: HOSPADM

## 2024-04-02 RX ADMIN — IOHEXOL 75 ML: 350 INJECTION, SOLUTION INTRAVENOUS at 02:47

## 2024-04-02 RX ADMIN — ONDANSETRON 4 MG: 2 INJECTION INTRAMUSCULAR; INTRAVENOUS at 00:40

## 2024-04-02 RX ADMIN — MORPHINE SULFATE 4 MG: 4 INJECTION, SOLUTION INTRAMUSCULAR; INTRAVENOUS at 00:40

## 2024-04-02 RX ADMIN — SODIUM CHLORIDE, POTASSIUM CHLORIDE, SODIUM LACTATE AND CALCIUM CHLORIDE 1000 ML: 600; 310; 30; 20 INJECTION, SOLUTION INTRAVENOUS at 00:40

## 2024-04-02 RX ADMIN — CIPROFLOXACIN 500 MG: 500 TABLET ORAL at 05:25

## 2024-04-02 RX ADMIN — METRONIDAZOLE 500 MG: 500 TABLET ORAL at 05:25

## 2024-04-02 ASSESSMENT — PAIN DESCRIPTION - LOCATION: LOCATION: ABDOMEN

## 2024-04-02 ASSESSMENT — PAIN SCALES - GENERAL: PAINLEVEL_OUTOF10: 4

## 2024-04-02 ASSESSMENT — PAIN DESCRIPTION - ORIENTATION: ORIENTATION: LOWER

## 2024-04-02 ASSESSMENT — PAIN - FUNCTIONAL ASSESSMENT: PAIN_FUNCTIONAL_ASSESSMENT: 0-10

## 2024-04-02 NOTE — ED TRIAGE NOTES
Pt comes into ed via private auto. Pt states diarrhea x1 week. Pt states she began to have bright red diarrhea today. Pt states she has been under a lot of stress as of late

## 2024-04-02 NOTE — ED PROVIDER NOTES
EMERGENCY DEPARTMENT ENCOUNTER      Pt Name: Toma Lane  MRN: [id number]  Birthdate 1955  Date of evaluation: 4/1/2024  Provider: Dima Suarez DO    CHIEF COMPLAINT       Chief Complaint   Patient presents with    Diarrhea       HISTORY OF PRESENT ILLNESS    Toma Lane is a 68 y.o. female who presents to the emergency department with Her self for watery diarrhea for the past week.  She notes today she had bright red blood in her stools.  She is also been having rectal pain.  Her last colonoscopy was years ago she does report that she is due for 1.  She endorses suprapubic pain 7 out of 10 nonradiating.  She states that she has been highly anxious and stressed as her  unexpectedly committed suicide on Shahriar Day.  Denies any previous abdominal surgeries.  She has had slight nausea without emesis still tolerating p.o.  No further associated symptoms at this time.          Nursing Notes were reviewed.    REVIEW OF SYSTEMS     CONSTITUTIONAL: Denies fever, sweats, chills.   NEURO: Denies difficulty walking, numbness, weakness, tingling, headache.   HEENT: Denies sore throat, rhinorrhea, changes in vision.   CARDIO: Denies chest pain, palpitations.  PULM: Denies shortness of breath, cough.   GI: Endorses suprapubic pain, nausea, diarrhea, hematochezia.  Denies vomiting, constipation, melena.  : Denies painful urination, frequency, hematuria.   MSK: Denies recent trauma.   SKIN: Denies rash, lesions.   ENDOCRINE: Denies unexpected weight-loss.   HEME: Denies bleeding disorder.     PAST MEDICAL HISTORY   No past medical history on file.    SURGICAL HISTORY       Past Surgical History:   Procedure Laterality Date    KNEE SURGERY  09/11/2018    Knee Surgery    OTHER SURGICAL HISTORY  01/25/2022    Ovarian cystectomy       ALLERGIES     Patient has no known allergies.    FAMILY HISTORY     No family history on file.     SOCIAL HISTORY       Social History     Socioeconomic History    Marital  status:      Spouse name: Not on file    Number of children: Not on file    Years of education: Not on file    Highest education level: Not on file   Occupational History    Not on file   Tobacco Use    Smoking status: Every Day     Types: Cigarettes    Smokeless tobacco: Current   Substance and Sexual Activity    Alcohol use: Yes    Drug use: Never    Sexual activity: Not on file   Other Topics Concern    Not on file   Social History Narrative    Not on file     Social Determinants of Health     Financial Resource Strain: Not on file   Food Insecurity: Not on file   Transportation Needs: Not on file   Physical Activity: Not on file   Stress: Not on file   Social Connections: Not on file   Intimate Partner Violence: Not on file   Housing Stability: Not on file       PHYSICAL EXAM   VS: As documented in the triage note from today's date and EMR flowsheet were reviewed.  Gen: Well developed. No acute distress. Seated in bed. Appears nontoxic.   Skin: Warm. Dry. Intact. No rashes or lesions.  Eyes: Pupils equally round and reactive to light. Clear sclera. EOMI.  HENT: Atraumatic appearance. Mucosal membranes moist. No oral lesions, uvula midline, airway patent.   CV: Regular rate and regular rhythm. S1, S2. No pedal edema. Warm extremities.  Resp: Nonlabored breathing Clear to auscultation bilaterally. No increased work of breathing.   GI: Soft and nontender. No rebound or guarding. Bowel sounds x4 present.  Encarnacion sign McBurney's point tenderness is negative no reproducible abdominal tenderness Laird Tate [place] sign negative no CVA tenderness.  Rectal examination performed with nursing staff at bedside skin around the rectum is excoriated.  There are external hemorrhoids none actively bleeding.  To appreciable internal hemorrhoids at the 6 and 7 o'clock position minimal bright red blood weeping no arterial bleeding.  MSK: Symmetric muscle bulk. No joint swelling in the extremities. Compartments are soft.  Neurovascularly intact x4 extremities. Radial pulses +2 equal bilaterally.  Pedal pulses +2 equal bilaterally.  Neuro: Alert. CN II - XII intact. Speech fluent. Moving all extremities. No focal deficits. Gait normal.  Psych: Appropriate. Kempt.    DIAGNOSTIC RESULTS   RADIOLOGY:   Non-plain film images such as CT, Ultrasound and MRI are read by the radiologist. Plain radiographic images are visualized and preliminarily interpreted by the emergency physician with the below findings:      Interpretation per the Radiologist below, if available at the time of this note:    CT abdomen pelvis w IV contrast    (Results Pending)         ED BEDSIDE ULTRASOUND:   Performed by ED Physician - none    LABS:  Labs Reviewed   CBC WITH AUTO DIFFERENTIAL - Abnormal       Result Value    WBC 9.4      nRBC 0.0      RBC 3.94 (*)     Hemoglobin 13.1      Hematocrit 38.7      MCV 98      MCH 33.2      MCHC 33.9      RDW 14.2      Platelets 524 (*)     Immature Granulocytes %, Automated 1.1 (*)     Immature Granulocytes Absolute, Automated 0.10      Narrative:     The previously reported component Neutrophils % is no longer being reported.  The previously reported component Lymphocytes % is no longer being reported.  The previously reported component Monocytes % is no longer being reported.  The previously   reported component Eosinophils % is no longer being reported.  The previously reported component Basophils % is no longer being reported.  The previously reported component Absolute Neutrophils is no longer being reported.  The previously reported   component Absolute Lymphocytes is no longer being reported.  The previously reported component Absolute Monocytes is no longer being reported.  The previously reported component Absolute Eosinophils is no longer being reported.  The previously reported   component Absolute Basophils is no longer being reported.   COMPREHENSIVE METABOLIC PANEL - Abnormal    Glucose 88      Sodium 138       Potassium 3.5      Chloride 109 (*)     Bicarbonate 19 (*)     Anion Gap 14      Urea Nitrogen 18      Creatinine 0.95      eGFR 65      Calcium 9.4      Albumin 3.6      Alkaline Phosphatase 71      Total Protein 7.1      AST 13      Bilirubin, Total 0.3      ALT 11     MANUAL DIFFERENTIAL - Abnormal    Neutrophils %, Manual 34.0      Bands %, Manual 14.0      Lymphocytes %, Manual 34.0      Monocytes %, Manual 15.0      Eosinophils %, Manual 2.0      Basophils %, Manual 1.0      Seg Neutrophils Absolute, Manual 3.20      Bands Absolute, Manual 1.32 (*)     Lymphocytes Absolute, Manual 3.20      Monocytes Absolute, Manual 1.41 (*)     Eosinophils Absolute, Manual 0.19      Basophils Absolute, Manual 0.09      Total Cells Counted 100      Neutrophils Absolute, Manual 4.52      RBC Morphology See Below      Polychromasia Mild      Usman Cells Few      Clumped Platelets Present     LIPASE - Normal    Lipase 10      Narrative:     Venipuncture immediately after or during the administration of Metamizole may lead to falsely low results. Testing should be performed immediately prior to Metamizole dosing.   COAGULATION SCREEN - Normal    Protime 11.7      INR 1.0      aPTT 37      Narrative:     The APTT is no longer used for monitoring Unfractionated Heparin Therapy. For monitoring Heparin Therapy, use the Heparin Assay.   SARS-COV-2 AND INFLUENZA A/B PCR - Normal    Flu A Result Not Detected      Flu B Result Not Detected      Coronavirus 2019, PCR Not Detected      Narrative:     This assay has received FDA Emergency Use Authorization (EUA) and  is only authorized for the duration of time that circumstances exist to justify the authorization of the emergency use of in vitro diagnostic tests for the detection of SARS-CoV-2 virus and/or diagnosis of COVID-19 infection under section 564(b)(1) of the Act, 21 U.S.C. 360bbb-3(b)(1). Testing for SARS-CoV-2 is only recommended for patients who meet current clinical and/or  "epidemiological criteria as defined by federal, state, or local public health directives. This assay is an in vitro diagnostic nucleic acid amplification test for the qualitative detection of SARS-CoV-2, Influenza A, and Influenza B from nasopharyngeal specimens and has been validated for use at [facility]. Negative results do not preclude COVID-19 infections or Influenza A/B infections, and should not be used as the sole basis for diagnosis, treatment, or other management decisions. If Influenza A/B and RSV PCR results are negative, testing for Parainfluenza virus, Adenovirus and Metapneumovirus is routinely performed for [facility] [place]ology and intensive care inpatients, and is available on other patients by placing an add-on request.    MAGNESIUM - Normal    Magnesium 1.68     URINALYSIS WITH REFLEX MICROSCOPIC       All other labs were within normal range or not returned as of this dictation.    EMERGENCY DEPARTMENT COURSE/MDM:   Vitals:    Vitals:    04/01/24 2130   BP: 112/79   Pulse: 81   Resp: 18   Temp: 36.6 °C (97.9 °F)   SpO2: 100%   Weight: 63.5 kg (140 lb)   Height: 1.6 m (5' 2.99\")       I reviewed the patient's triage vitals and they are within normal range.    Due to the above findings the following was ordered basic labs EKG morphine for pain Zofran for any refractory nausea fluid bolus CT imaging abdomen pelvis.    Lab work reveals no evidence of leukocytosis making infectious etiology less likely no signs of anemia either no significant electrolyte derangements renal function appropriate LFTs within normal range.  Viral swabs are negative.  Signed out to the oncoming physician to follow-up on urinalysis and CT imaging abdomen pelvis.  Patient remains hemodynamically stable resting comfortably.    Diagnoses as of 04/03/24 0038   Bloody stools   Diarrhea, unspecified type   Colitis       Patient was counseled regarding labs, imaging, likely diagnosis, and plan. All " questions were answered.     ------------------------------------------------------------------  Information provided by the patient  Past medical history complicating workup hypertension  Previous medical records reviewed previous office visit 3/29/2024  ------------------------------------------------------------------  ED Medications administered this visit:    Medications   lactated Ringer's bolus 1,000 mL (1,000 mL intravenous New Bag 4/2/24 0040)   morphine injection 4 mg (4 mg intravenous Given 4/2/24 0040)   ondansetron (Zofran) injection 4 mg (4 mg intravenous Given 4/2/24 0040)        Final Impression:   1. Bloody stools    2. Diarrhea, unspecified type          Dima Suarez DO    (Please note that portions of this note were completed with a voice recognition program.  Efforts were made to edit the dictations but occasionally words are mis-transcribed.)     Dima Suarez DO  04/03/24 0038

## 2024-04-02 NOTE — ED PROVIDER NOTES
Patient was turned over to me with CT abdomen pelvis pending.  Is consistent with colitis and proctitis.  Patient be treated with ciprofloxacin and Flagyl.  She will follow with her primary care doctor and will likely require a colonoscopy in the future.  She had no diarrhea here in the emergency department.  No history of C. difficile.  Patient be discharged.     Shlomo Alvarez MD  04/02/24 0518

## 2024-04-03 ENCOUNTER — APPOINTMENT (OUTPATIENT)
Dept: CARDIOLOGY | Facility: HOSPITAL | Age: 69
DRG: 373 | End: 2024-04-03
Payer: MEDICARE

## 2024-04-03 ENCOUNTER — HOSPITAL ENCOUNTER (INPATIENT)
Facility: HOSPITAL | Age: 69
LOS: 2 days | Discharge: HOME | DRG: 373 | End: 2024-04-06
Attending: STUDENT IN AN ORGANIZED HEALTH CARE EDUCATION/TRAINING PROGRAM | Admitting: STUDENT IN AN ORGANIZED HEALTH CARE EDUCATION/TRAINING PROGRAM
Payer: MEDICARE

## 2024-04-03 ENCOUNTER — TELEPHONE (OUTPATIENT)
Dept: PRIMARY CARE | Facility: CLINIC | Age: 69
End: 2024-04-03
Payer: MEDICARE

## 2024-04-03 DIAGNOSIS — K52.9 COLITIS: ICD-10-CM

## 2024-04-03 DIAGNOSIS — R10.84 GENERALIZED ABDOMINAL PAIN: Primary | ICD-10-CM

## 2024-04-03 DIAGNOSIS — R53.1 WEAKNESS: ICD-10-CM

## 2024-04-03 DIAGNOSIS — A04.72 C. DIFFICILE COLITIS: ICD-10-CM

## 2024-04-03 DIAGNOSIS — K64.9 HEMORRHOIDS, UNSPECIFIED HEMORRHOID TYPE: ICD-10-CM

## 2024-04-03 LAB
ALBUMIN SERPL BCP-MCNC: 3.3 G/DL (ref 3.4–5)
ALP SERPL-CCNC: 63 U/L (ref 33–136)
ALT SERPL W P-5'-P-CCNC: 11 U/L (ref 7–45)
ANION GAP SERPL CALC-SCNC: 16 MMOL/L (ref 10–20)
AST SERPL W P-5'-P-CCNC: 12 U/L (ref 9–39)
BASOPHILS # BLD AUTO: 0.07 X10*3/UL (ref 0–0.1)
BASOPHILS NFR BLD AUTO: 0.8 %
BILIRUB SERPL-MCNC: 0.3 MG/DL (ref 0–1.2)
BUN SERPL-MCNC: 17 MG/DL (ref 6–23)
CALCIUM SERPL-MCNC: 9 MG/DL (ref 8.6–10.3)
CHLORIDE SERPL-SCNC: 107 MMOL/L (ref 98–107)
CO2 SERPL-SCNC: 20 MMOL/L (ref 21–32)
CREAT SERPL-MCNC: 0.96 MG/DL (ref 0.5–1.05)
EGFRCR SERPLBLD CKD-EPI 2021: 64 ML/MIN/1.73M*2
EOSINOPHIL # BLD AUTO: 0.04 X10*3/UL (ref 0–0.7)
EOSINOPHIL NFR BLD AUTO: 0.4 %
ERYTHROCYTE [DISTWIDTH] IN BLOOD BY AUTOMATED COUNT: 13.9 % (ref 11.5–14.5)
GLUCOSE SERPL-MCNC: 81 MG/DL (ref 74–99)
HCT VFR BLD AUTO: 37.2 % (ref 36–46)
HGB BLD-MCNC: 12.8 G/DL (ref 12–16)
IMM GRANULOCYTES # BLD AUTO: 0.09 X10*3/UL (ref 0–0.7)
IMM GRANULOCYTES NFR BLD AUTO: 1 % (ref 0–0.9)
LACTATE SERPL-SCNC: 0.7 MMOL/L (ref 0.4–2)
LIPASE SERPL-CCNC: 4 U/L (ref 9–82)
LYMPHOCYTES # BLD AUTO: 1.67 X10*3/UL (ref 1.2–4.8)
LYMPHOCYTES NFR BLD AUTO: 18.7 %
MAGNESIUM SERPL-MCNC: 1.5 MG/DL (ref 1.6–2.4)
MCH RBC QN AUTO: 33.2 PG (ref 26–34)
MCHC RBC AUTO-ENTMCNC: 34.4 G/DL (ref 32–36)
MCV RBC AUTO: 97 FL (ref 80–100)
MONOCYTES # BLD AUTO: 1.12 X10*3/UL (ref 0.1–1)
MONOCYTES NFR BLD AUTO: 12.6 %
NEUTROPHILS # BLD AUTO: 5.93 X10*3/UL (ref 1.2–7.7)
NEUTROPHILS NFR BLD AUTO: 66.5 %
NRBC BLD-RTO: 0 /100 WBCS (ref 0–0)
PLATELET # BLD AUTO: 479 X10*3/UL (ref 150–450)
POTASSIUM SERPL-SCNC: 3.6 MMOL/L (ref 3.5–5.3)
PROT SERPL-MCNC: 6.1 G/DL (ref 6.4–8.2)
RBC # BLD AUTO: 3.85 X10*6/UL (ref 4–5.2)
SODIUM SERPL-SCNC: 139 MMOL/L (ref 136–145)
WBC # BLD AUTO: 8.9 X10*3/UL (ref 4.4–11.3)

## 2024-04-03 PROCEDURE — 96372 THER/PROPH/DIAG INJ SC/IM: CPT

## 2024-04-03 PROCEDURE — 87506 IADNA-DNA/RNA PROBE TQ 6-11: CPT | Mod: PARLAB

## 2024-04-03 PROCEDURE — 83735 ASSAY OF MAGNESIUM: CPT | Performed by: PHYSICIAN ASSISTANT

## 2024-04-03 PROCEDURE — 96375 TX/PRO/DX INJ NEW DRUG ADDON: CPT

## 2024-04-03 PROCEDURE — 87493 C DIFF AMPLIFIED PROBE: CPT | Mod: 59,PARLAB

## 2024-04-03 PROCEDURE — 36415 COLL VENOUS BLD VENIPUNCTURE: CPT | Performed by: PHYSICIAN ASSISTANT

## 2024-04-03 PROCEDURE — 2500000001 HC RX 250 WO HCPCS SELF ADMINISTERED DRUGS (ALT 637 FOR MEDICARE OP)

## 2024-04-03 PROCEDURE — 2500000004 HC RX 250 GENERAL PHARMACY W/ HCPCS (ALT 636 FOR OP/ED): Performed by: STUDENT IN AN ORGANIZED HEALTH CARE EDUCATION/TRAINING PROGRAM

## 2024-04-03 PROCEDURE — 93005 ELECTROCARDIOGRAM TRACING: CPT

## 2024-04-03 PROCEDURE — 85025 COMPLETE CBC W/AUTO DIFF WBC: CPT | Performed by: PHYSICIAN ASSISTANT

## 2024-04-03 PROCEDURE — 83690 ASSAY OF LIPASE: CPT | Performed by: PHYSICIAN ASSISTANT

## 2024-04-03 PROCEDURE — 83605 ASSAY OF LACTIC ACID: CPT | Performed by: PHYSICIAN ASSISTANT

## 2024-04-03 PROCEDURE — 87324 CLOSTRIDIUM AG IA: CPT | Mod: 59,PARLAB

## 2024-04-03 PROCEDURE — G0378 HOSPITAL OBSERVATION PER HR: HCPCS

## 2024-04-03 PROCEDURE — 99285 EMERGENCY DEPT VISIT HI MDM: CPT | Mod: 25

## 2024-04-03 PROCEDURE — 80053 COMPREHEN METABOLIC PANEL: CPT | Performed by: PHYSICIAN ASSISTANT

## 2024-04-03 PROCEDURE — 2500000004 HC RX 250 GENERAL PHARMACY W/ HCPCS (ALT 636 FOR OP/ED)

## 2024-04-03 PROCEDURE — 96365 THER/PROPH/DIAG IV INF INIT: CPT | Mod: 59

## 2024-04-03 PROCEDURE — 2500000004 HC RX 250 GENERAL PHARMACY W/ HCPCS (ALT 636 FOR OP/ED): Performed by: PHYSICIAN ASSISTANT

## 2024-04-03 PROCEDURE — 96366 THER/PROPH/DIAG IV INF ADDON: CPT

## 2024-04-03 RX ORDER — MORPHINE SULFATE 2 MG/ML
2 INJECTION, SOLUTION INTRAMUSCULAR; INTRAVENOUS ONCE
Status: DISCONTINUED | OUTPATIENT
Start: 2024-04-03 | End: 2024-04-03

## 2024-04-03 RX ORDER — TRIAMCINOLONE ACETONIDE 1 MG/G
CREAM TOPICAL 2 TIMES DAILY PRN
Status: DISCONTINUED | OUTPATIENT
Start: 2024-04-03 | End: 2024-04-06 | Stop reason: HOSPADM

## 2024-04-03 RX ORDER — AMLODIPINE BESYLATE 5 MG/1
2.5 TABLET ORAL DAILY
Status: DISCONTINUED | OUTPATIENT
Start: 2024-04-03 | End: 2024-04-06 | Stop reason: HOSPADM

## 2024-04-03 RX ORDER — FLUTICASONE PROPIONATE 50 MCG
2 SPRAY, SUSPENSION (ML) NASAL
Status: DISCONTINUED | OUTPATIENT
Start: 2024-04-03 | End: 2024-04-06 | Stop reason: HOSPADM

## 2024-04-03 RX ORDER — ONDANSETRON HYDROCHLORIDE 2 MG/ML
4 INJECTION, SOLUTION INTRAVENOUS ONCE
Status: COMPLETED | OUTPATIENT
Start: 2024-04-03 | End: 2024-04-03

## 2024-04-03 RX ORDER — ACETAMINOPHEN 650 MG/1
650 SUPPOSITORY RECTAL EVERY 4 HOURS PRN
Status: DISCONTINUED | OUTPATIENT
Start: 2024-04-03 | End: 2024-04-06 | Stop reason: HOSPADM

## 2024-04-03 RX ORDER — ONDANSETRON HYDROCHLORIDE 2 MG/ML
4 INJECTION, SOLUTION INTRAVENOUS EVERY 8 HOURS PRN
Status: DISCONTINUED | OUTPATIENT
Start: 2024-04-03 | End: 2024-04-06 | Stop reason: HOSPADM

## 2024-04-03 RX ORDER — ACETAMINOPHEN 325 MG/1
650 TABLET ORAL EVERY 4 HOURS PRN
Status: DISCONTINUED | OUTPATIENT
Start: 2024-04-03 | End: 2024-04-06 | Stop reason: HOSPADM

## 2024-04-03 RX ORDER — MORPHINE SULFATE 4 MG/ML
4 INJECTION, SOLUTION INTRAMUSCULAR; INTRAVENOUS ONCE
Status: COMPLETED | OUTPATIENT
Start: 2024-04-03 | End: 2024-04-03

## 2024-04-03 RX ORDER — MAGNESIUM SULFATE HEPTAHYDRATE 40 MG/ML
2 INJECTION, SOLUTION INTRAVENOUS ONCE
Status: COMPLETED | OUTPATIENT
Start: 2024-04-03 | End: 2024-04-03

## 2024-04-03 RX ORDER — ACETAMINOPHEN 160 MG/5ML
650 SOLUTION ORAL EVERY 4 HOURS PRN
Status: DISCONTINUED | OUTPATIENT
Start: 2024-04-03 | End: 2024-04-06 | Stop reason: HOSPADM

## 2024-04-03 RX ORDER — TALC
3 POWDER (GRAM) TOPICAL NIGHTLY PRN
Status: DISCONTINUED | OUTPATIENT
Start: 2024-04-03 | End: 2024-04-06 | Stop reason: HOSPADM

## 2024-04-03 RX ORDER — ONDANSETRON 4 MG/1
4 TABLET, FILM COATED ORAL EVERY 8 HOURS PRN
Status: DISCONTINUED | OUTPATIENT
Start: 2024-04-03 | End: 2024-04-06 | Stop reason: HOSPADM

## 2024-04-03 RX ORDER — ESCITALOPRAM OXALATE 10 MG/1
10 TABLET ORAL DAILY
Status: DISCONTINUED | OUTPATIENT
Start: 2024-04-03 | End: 2024-04-06 | Stop reason: HOSPADM

## 2024-04-03 RX ORDER — SODIUM CHLORIDE, SODIUM LACTATE, POTASSIUM CHLORIDE, CALCIUM CHLORIDE 600; 310; 30; 20 MG/100ML; MG/100ML; MG/100ML; MG/100ML
100 INJECTION, SOLUTION INTRAVENOUS CONTINUOUS
Status: ACTIVE | OUTPATIENT
Start: 2024-04-03 | End: 2024-04-04

## 2024-04-03 RX ORDER — ENOXAPARIN SODIUM 100 MG/ML
40 INJECTION SUBCUTANEOUS EVERY 24 HOURS
Status: DISCONTINUED | OUTPATIENT
Start: 2024-04-03 | End: 2024-04-06 | Stop reason: HOSPADM

## 2024-04-03 RX ORDER — LISINOPRIL 40 MG/1
40 TABLET ORAL DAILY
Status: DISCONTINUED | OUTPATIENT
Start: 2024-04-03 | End: 2024-04-06 | Stop reason: HOSPADM

## 2024-04-03 RX ORDER — ONDANSETRON HYDROCHLORIDE 2 MG/ML
4 INJECTION, SOLUTION INTRAVENOUS ONCE
Status: DISCONTINUED | OUTPATIENT
Start: 2024-04-03 | End: 2024-04-03

## 2024-04-03 RX ADMIN — SODIUM CHLORIDE, POTASSIUM CHLORIDE, SODIUM LACTATE AND CALCIUM CHLORIDE 100 ML/HR: 600; 310; 30; 20 INJECTION, SOLUTION INTRAVENOUS at 18:19

## 2024-04-03 RX ADMIN — LISINOPRIL 40 MG: 40 TABLET ORAL at 18:32

## 2024-04-03 RX ADMIN — PIPERACILLIN SODIUM AND TAZOBACTAM SODIUM 3.38 G: 3; .375 INJECTION, SOLUTION INTRAVENOUS at 21:45

## 2024-04-03 RX ADMIN — ACETAMINOPHEN 650 MG: 325 TABLET ORAL at 21:22

## 2024-04-03 RX ADMIN — ESCITALOPRAM OXALATE 10 MG: 10 TABLET ORAL at 18:32

## 2024-04-03 RX ADMIN — MORPHINE SULFATE 4 MG: 4 INJECTION, SOLUTION INTRAMUSCULAR; INTRAVENOUS at 16:14

## 2024-04-03 RX ADMIN — SODIUM CHLORIDE 1000 ML: 9 INJECTION, SOLUTION INTRAVENOUS at 16:07

## 2024-04-03 RX ADMIN — AMLODIPINE BESYLATE 2.5 MG: 5 TABLET ORAL at 18:32

## 2024-04-03 RX ADMIN — MAGNESIUM SULFATE HEPTAHYDRATE 2 G: 40 INJECTION, SOLUTION INTRAVENOUS at 16:07

## 2024-04-03 RX ADMIN — ONDANSETRON 4 MG: 2 INJECTION INTRAMUSCULAR; INTRAVENOUS at 16:14

## 2024-04-03 RX ADMIN — ENOXAPARIN SODIUM 40 MG: 40 INJECTION SUBCUTANEOUS at 18:32

## 2024-04-03 RX ADMIN — PIPERACILLIN SODIUM AND TAZOBACTAM SODIUM 3.38 G: 3; .375 INJECTION, SOLUTION INTRAVENOUS at 17:52

## 2024-04-03 SDOH — SOCIAL STABILITY: SOCIAL INSECURITY: HAS ANYONE EVER THREATENED TO HURT YOUR FAMILY OR YOUR PETS?: NO

## 2024-04-03 SDOH — SOCIAL STABILITY: SOCIAL INSECURITY: ABUSE: ADULT

## 2024-04-03 SDOH — SOCIAL STABILITY: SOCIAL INSECURITY: HAVE YOU HAD THOUGHTS OF HARMING ANYONE ELSE?: NO

## 2024-04-03 SDOH — SOCIAL STABILITY: SOCIAL INSECURITY: DO YOU FEEL ANYONE HAS EXPLOITED OR TAKEN ADVANTAGE OF YOU FINANCIALLY OR OF YOUR PERSONAL PROPERTY?: NO

## 2024-04-03 SDOH — SOCIAL STABILITY: SOCIAL INSECURITY: WERE YOU ABLE TO COMPLETE ALL THE BEHAVIORAL HEALTH SCREENINGS?: YES

## 2024-04-03 SDOH — SOCIAL STABILITY: SOCIAL INSECURITY: ARE THERE ANY APPARENT SIGNS OF INJURIES/BEHAVIORS THAT COULD BE RELATED TO ABUSE/NEGLECT?: NO

## 2024-04-03 SDOH — SOCIAL STABILITY: SOCIAL INSECURITY: DO YOU FEEL UNSAFE GOING BACK TO THE PLACE WHERE YOU ARE LIVING?: NO

## 2024-04-03 SDOH — SOCIAL STABILITY: SOCIAL INSECURITY: ARE YOU OR HAVE YOU BEEN THREATENED OR ABUSED PHYSICALLY, EMOTIONALLY, OR SEXUALLY BY ANYONE?: NO

## 2024-04-03 SDOH — SOCIAL STABILITY: SOCIAL INSECURITY: DOES ANYONE TRY TO KEEP YOU FROM HAVING/CONTACTING OTHER FRIENDS OR DOING THINGS OUTSIDE YOUR HOME?: NO

## 2024-04-03 ASSESSMENT — COLUMBIA-SUICIDE SEVERITY RATING SCALE - C-SSRS
2. HAVE YOU ACTUALLY HAD ANY THOUGHTS OF KILLING YOURSELF?: NO
1. IN THE PAST MONTH, HAVE YOU WISHED YOU WERE DEAD OR WISHED YOU COULD GO TO SLEEP AND NOT WAKE UP?: NO
1. IN THE PAST MONTH, HAVE YOU WISHED YOU WERE DEAD OR WISHED YOU COULD GO TO SLEEP AND NOT WAKE UP?: NO
6. HAVE YOU EVER DONE ANYTHING, STARTED TO DO ANYTHING, OR PREPARED TO DO ANYTHING TO END YOUR LIFE?: NO
2. HAVE YOU ACTUALLY HAD ANY THOUGHTS OF KILLING YOURSELF?: NO
6. HAVE YOU EVER DONE ANYTHING, STARTED TO DO ANYTHING, OR PREPARED TO DO ANYTHING TO END YOUR LIFE?: NO

## 2024-04-03 ASSESSMENT — COGNITIVE AND FUNCTIONAL STATUS - GENERAL
DAILY ACTIVITIY SCORE: 24
CLIMB 3 TO 5 STEPS WITH RAILING: A LITTLE
PATIENT BASELINE BEDBOUND: NO
MOBILITY SCORE: 23
DAILY ACTIVITIY SCORE: 24
MOBILITY SCORE: 24

## 2024-04-03 ASSESSMENT — ACTIVITIES OF DAILY LIVING (ADL)
HEARING - LEFT EAR: FUNCTIONAL
BATHING: INDEPENDENT
DRESSING YOURSELF: INDEPENDENT
WALKS IN HOME: INDEPENDENT
GROOMING: INDEPENDENT
ADEQUATE_TO_COMPLETE_ADL: YES
LACK_OF_TRANSPORTATION: NO
TOILETING: INDEPENDENT
PATIENT'S MEMORY ADEQUATE TO SAFELY COMPLETE DAILY ACTIVITIES?: YES
HEARING - RIGHT EAR: FUNCTIONAL
JUDGMENT_ADEQUATE_SAFELY_COMPLETE_DAILY_ACTIVITIES: YES
FEEDING YOURSELF: INDEPENDENT

## 2024-04-03 ASSESSMENT — LIFESTYLE VARIABLES
PRESCIPTION_ABUSE_PAST_12_MONTHS: NO
HAVE YOU EVER FELT YOU SHOULD CUT DOWN ON YOUR DRINKING: NO
SKIP TO QUESTIONS 9-10: 1
AUDIT-C TOTAL SCORE: 0
HAVE PEOPLE ANNOYED YOU BY CRITICIZING YOUR DRINKING: NO
EVER FELT BAD OR GUILTY ABOUT YOUR DRINKING: NO
EVER HAD A DRINK FIRST THING IN THE MORNING TO STEADY YOUR NERVES TO GET RID OF A HANGOVER: NO
HOW OFTEN DO YOU HAVE 6 OR MORE DRINKS ON ONE OCCASION: NEVER
AUDIT-C TOTAL SCORE: 0
SUBSTANCE_ABUSE_PAST_12_MONTHS: NO
HOW MANY STANDARD DRINKS CONTAINING ALCOHOL DO YOU HAVE ON A TYPICAL DAY: PATIENT DOES NOT DRINK
TOTAL SCORE: 0
HOW OFTEN DO YOU HAVE A DRINK CONTAINING ALCOHOL: NEVER

## 2024-04-03 ASSESSMENT — PAIN - FUNCTIONAL ASSESSMENT: PAIN_FUNCTIONAL_ASSESSMENT: 0-10

## 2024-04-03 ASSESSMENT — PAIN SCALES - GENERAL
PAINLEVEL_OUTOF10: 4
PAINLEVEL_OUTOF10: 8

## 2024-04-03 ASSESSMENT — PATIENT HEALTH QUESTIONNAIRE - PHQ9
2. FEELING DOWN, DEPRESSED OR HOPELESS: NOT AT ALL
1. LITTLE INTEREST OR PLEASURE IN DOING THINGS: NOT AT ALL
SUM OF ALL RESPONSES TO PHQ9 QUESTIONS 1 & 2: 0

## 2024-04-03 NOTE — H&P
"Toma Lane is a 68 y.o. female on day 0 of admission presenting with Colitis.    HPI    Patient is a 68-year-old female with medical history significant for anxiety, hypertension on lisinopril 40 mg, amlodipine 2.5 mg, plaque psoriasis, Raynaud's presenting to [facility] due to episodes of diarrhea.  Patient noted that symptoms started on Friday and she was having recurrent episodes of diarrhea and she noted them to bloody.  She initially followed up with her PCP and was on antibiotics for colitis however symptoms did continue to persist and she presented to the emergency department on Monday.  CT imaging on Monday showed patient likely had colitis with no history of C. difficile and was discharged with Cipro and Flagyl.  Patient still continues to have bouts of diarrhea however she noted that they have mostly been mucus-like consistency now since she got discharged from the emergency department 2 days ago.  Patient noted that she has been having about 5-10 episodes of diarrhea daily and because she lives alone she has been very concerned about the symptoms especially because she feels very lightheaded and dehydrated from these recurrent episodes of diarrhea.  Patient denies any fevers, chills, nausea, vomiting, shortness of breath, chest pain, numbness or tingling.  Patient also denies any recent sick contacts, any history of C. difficile.  She did note that a couple weeks ago she was on amoxicillin per her dentist however did not have any episodes of diarrhea while she was on her antibiotic course or after.  Symptoms mostly started since Friday according to the patient.  Additionally, patient did endorse that she has history of hemorrhoids and feels like her hemorrhoid is \"flaring up\" and has been causing her a lot of pain.    In the emergency department, patient is hemodynamically stable.  Labs significant for potassium 3.6, bicarb 30, magnesium 1.5.  CT imaging on 4/2 shows infectious versus inflammatory " "colitis involving the sigmoid and rectum.  There is also diffuse enlargement of the adrenal gland suggestive of hypertrophy however there is nodular heterogenicity particularly in the right adrenal can with recommendation to follow-up with a adrenal MRI or CT outpatient.    Past Medical History: As noted above  Social History: Patient still smokes.  Denies illicit alcohol use.  Lives at home by herself.  Family History : Family history of diabetes, MI    Review of Systems     Complete 12 point review of systems obtained.  Negative aside from HPI.    OBJECTIVE    Vitals:    04/03/24 1150 04/03/24 1625   BP: 153/89 143/86   BP Location: Right arm    Patient Position: Sitting    Pulse: 94 76   Resp: 18 15   Temp: 36.3 °C (97.3 °F)    TempSrc: Temporal    SpO2: 97% 96%   Weight: 63.5 kg (140 lb)    Height: 1.575 m (5' 2\")       Results from last 7 days   Lab Units 04/03/24  1225   WBC AUTO x10*3/uL 8.9   HEMOGLOBIN g/dL 12.8   HEMATOCRIT % 37.2   PLATELETS AUTO x10*3/uL 479*   NEUTROS PCT AUTO % 66.5   LYMPHS PCT AUTO % 18.7   MONOS PCT AUTO % 12.6   EOS PCT AUTO % 0.4     Results from last 7 days   Lab Units 04/03/24  1225   SODIUM mmol/L 139   POTASSIUM mmol/L 3.6   CHLORIDE mmol/L 107   CO2 mmol/L 20*   BUN mg/dL 17   CREATININE mg/dL 0.96   CALCIUM mg/dL 9.0   PROTEIN TOTAL g/dL 6.1*   BILIRUBIN TOTAL mg/dL 0.3   ALK PHOS U/L 63   ALT U/L 11   AST U/L 12   GLUCOSE mg/dL 81       Scheduled Medications  magnesium sulfate, 2 g, intravenous, Once  sodium chloride, 1,000 mL, intravenous, Once           Physical Exam  HENT:      Head: Normocephalic.      Mouth/Throat:      Mouth: Mucous membranes are dry.   Eyes:      Pupils: Pupils are equal, round, and reactive to light.   Cardiovascular:      Rate and Rhythm: Normal rate and regular rhythm.   Pulmonary:      Breath sounds: Normal breath sounds.   Abdominal:      General: Bowel sounds are normal.      Palpations: Abdomen is soft.      Tenderness: There is abdominal " tenderness (Tenderness and left lower quadrant).   Genitourinary:     Comments: Large external hemorrhoid approximately 2 cm x 2 cm  Musculoskeletal:         General: Normal range of motion.      Cervical back: Normal range of motion.   Skin:     General: Skin is warm and dry.   Neurological:      General: No focal deficit present.      Mental Status: She is alert.   Psychiatric:         Mood and Affect: Mood normal.            ASSESSMENT & PLAN    Colitis, infectious versus inflammatory  External hemorrhoids  PLAN:  -Patient was receiving antibiotics ciprofloxacin and Flagyl in outpatient setting for about 3 days.  Will start patient on IV Zosyn start date 4/3  -Sitz bath ordered for external hemorrhoids  -Patient denies any history of C. difficile however given her episodes of diarrhea and antibiotic use, will get a C. difficile and stool PCR testing    Hypomagnesemia  Hypokalemia  Dehydration likely secondary to diarrhea  PLAN:  -Maintain potassium greater than 4, magnesium greater than 2  -Patient on IV fluids  cc an hour.    Hypertension  PLAN:  -Continue patient's home medications amlodipine and lisinopril    Chronic medical conditions  Anxiety/depression: Continue Lexapro  Plaque psoriasis  Raynaud's    DVT ppx: Lovenox  GI ppx: Protonix  IVF:  cc an hour  Diet: Regular  Consults: None    Eddie Mcqueen, DO  PGY-2, Internal Medicine   Please SecureChat for any further questions  This is a preliminary note, please await attending attestation for final A/P

## 2024-04-03 NOTE — ED TRIAGE NOTES
Pt to ER via triage c/o  abd pain, nausea, diarrhea, and hemorrhoids. Pt is currently taking antibiotics. Pt was here on Monday and was diagnosed with colitis.

## 2024-04-03 NOTE — ED PROVIDER NOTES
Limitations to History: None  External Records Reviewed  Independent Historians: None  Social determinants affecting care: None     HPI  Toma Lane is a 68 y.o. female who presents emergency department due to generally not feeling well.  She reports that she is been having diarrhea for the last week.  Initially it was bloody but now is just mucus.  She reports that she was seen in the ER yesterday and diagnosed with colitis.  She reports that she was placed on antibiotics.  She is been taking these but she has not had improvement.  She reports that she feels very weak at home and feels like she is going to pass out.  She reports that she is still having lower abdominal pains.  She reports nausea but has not had vomiting.  She reports she has been having intermittent fevers.  She denies changes to urination.  She reports that she lives at home alone and she is scared she is going to falland is not taking care of herself.  She has no further complaints.  Her daughter-in-law is at bedside.    PMH  No past medical history on file. reviewed by myself.    Meds  Current Outpatient Medications   Medication Instructions    amLODIPine (NORVASC) 2.5 mg, oral, Daily    betamethasone valerate (Valisone) 0.1 % cream APPLY TWICE A DAY TOPICALLY TO PLAQUES ON BODY    ciprofloxacin (CIPRO) 500 mg, oral, 2 times daily    clobetasol (Temovate) 0.05 % cream APPLY TWICE A DAY TOPICALLY TO ALL PLAQUE AREAS-USE FOR 2 WKS,THEN STEP DOWN TO BETAMETHASONE CREAM    clobetasol (Temovate) 0.05 % external solution 1 APPLICATION TWICE A DAY TOPICALLY TO ACTIVE AREAS ON SCALP    escitalopram (LEXAPRO) 10 mg, oral, Daily    fluticasone (Flonase) 50 mcg/actuation nasal spray nasal, Daily RT    ibuprofen 400 mg, oral    lisinopril 40 mg, oral, Daily    loratadine (CLARITIN) 10 mg, oral, Daily PRN    LORazepam (ATIVAN) 0.5 mg, oral, Daily PRN    metroNIDAZOLE (FLAGYL) 500 mg, oral, 3 times daily       Allergies  No Known Allergies reviewed by  "myself.    SHx  Social History     Tobacco Use    Smoking status: Every Day     Types: Cigarettes    Smokeless tobacco: Current   Substance Use Topics    Alcohol use: Yes    Drug use: Never    reviewed by myself.      ------------------------------------------------------------------------------------------------------------------------------------------    /89 (BP Location: Right arm, Patient Position: Sitting)   Pulse 94   Temp 36.3 °C (97.3 °F) (Temporal)   Resp 18   Ht 1.575 m (5' 2\")   Wt 63.5 kg (140 lb)   SpO2 97%   BMI 25.61 kg/m²     Physical Exam  Vitals and nursing note reviewed.   Constitutional:       Appearance: Normal appearance. She is normal weight.   HENT:      Head: Normocephalic.      Nose: Nose normal.      Mouth/Throat:      Mouth: Mucous membranes are dry.   Eyes:      Extraocular Movements: Extraocular movements intact.      Conjunctiva/sclera: Conjunctivae normal.   Cardiovascular:      Rate and Rhythm: Normal rate and regular rhythm.   Pulmonary:      Effort: Pulmonary effort is normal.      Breath sounds: Normal breath sounds.   Abdominal:      General: Abdomen is flat. Bowel sounds are normal.      Palpations: Abdomen is soft.      Tenderness: There is abdominal tenderness in the right lower quadrant, suprapubic area and left lower quadrant. There is no guarding or rebound.   Genitourinary:     Comments: Large prolapsed internal hernia that is tender to the touch.  No active bleeding from the hemorrhoid.  Musculoskeletal:         General: Normal range of motion.      Cervical back: Neck supple.   Skin:     General: Skin is warm and dry.   Neurological:      General: No focal deficit present.      Mental Status: She is alert and oriented to person, place, and time.   Psychiatric:         Attention and Perception: Attention normal.         Mood and Affect: Mood normal.      "     ------------------------------------------------------------------------------------------------------------------------------------------  Labs  Labs Reviewed   CBC WITH AUTO DIFFERENTIAL - Abnormal       Result Value    WBC 8.9      nRBC 0.0      RBC 3.85 (*)     Hemoglobin 12.8      Hematocrit 37.2      MCV 97      MCH 33.2      MCHC 34.4      RDW 13.9      Platelets 479 (*)     Neutrophils % 66.5      Immature Granulocytes %, Automated 1.0 (*)     Lymphocytes % 18.7      Monocytes % 12.6      Eosinophils % 0.4      Basophils % 0.8      Neutrophils Absolute 5.93      Immature Granulocytes Absolute, Automated 0.09      Lymphocytes Absolute 1.67      Monocytes Absolute 1.12 (*)     Eosinophils Absolute 0.04      Basophils Absolute 0.07     COMPREHENSIVE METABOLIC PANEL - Abnormal    Glucose 81      Sodium 139      Potassium 3.6      Chloride 107      Bicarbonate 20 (*)     Anion Gap 16      Urea Nitrogen 17      Creatinine 0.96      eGFR 64      Calcium 9.0      Albumin 3.3 (*)     Alkaline Phosphatase 63      Total Protein 6.1 (*)     AST 12      Bilirubin, Total 0.3      ALT 11     MAGNESIUM - Abnormal    Magnesium 1.50 (*)    LIPASE - Abnormal    Lipase 4 (*)     Narrative:     Venipuncture immediately after or during the administration of Metamizole may lead to falsely low results. Testing should be performed immediately prior to Metamizole dosing.   LACTATE - Normal    Lactate 0.7      Narrative:     Venipuncture immediately after or during the administration of Metamizole may lead to falsely low results. Testing should be performed immediately  prior to Metamizole dosing.   URINALYSIS WITH REFLEX CULTURE AND MICROSCOPIC    Narrative:     The following orders were created for panel order Urinalysis with Reflex Culture and Microscopic.  Procedure                               Abnormality         Status                     ---------                               -----------         ------                      Urinalysis with Reflex C...[[id number]]                                                 Extra Urine Gray Tube[[id number]]                                                         Please view results for these tests on the individual orders.   URINALYSIS WITH REFLEX CULTURE AND MICROSCOPIC   EXTRA URINE GRAY TUBE        Imaging  No orders to display        ED Course  Diagnoses as of 04/03/24 1609   Generalized abdominal pain   Colitis   Hemorrhoids, unspecified hemorrhoid type   Weakness        Medical Decision Making: She did not appear ill or toxic.  Vital signs reviewed and stable.  I did assess the patient in triage.  She does appear clinically dehydrated.  Lab work was ordered.  Her abdominal pain has not really worsened so I did not repeat the CT imaging she had yesterday.  Labs were reviewed.  No leukocytosis or leukopenia.  H&H stable.  Platelets slightly elevated 479.  Bicarb decreased at 20.  Normal anion gap.  BUN and creatinine normal.  Lipase negative.  Magnesium slightly decreased at 1.50.  Lactic is normal.  EKG reviewed by myself and ED attending showing normal sinus rhythm.  Ventricular rate 71 bpm.  No acute ST elevations or depressions.  Right bundle branch block noted.  Once patient was brought back to an evaluation room, she was given 2 g of IV magnesium and IV fluids.  She reports that she cannot go home because she feels too weak.  I consulted her PCP.  I spoke with Dr. Simeon will admit.  Case discussed and evaluated with ED attending, Dr. Hayden who is agreeable to patient plan of care.    Diagnosis: Colitis, hemorrhoid, weakness  Plan: Admit     Corby Stein PA-C  04/03/24 0706

## 2024-04-03 NOTE — NURSING NOTE
Admission and med rec complete. Patient's  committed suicide December 2023. Patient states she has had constant stress since then

## 2024-04-03 NOTE — ED TRIAGE NOTES
The patient was seen and examined in triage.    History of Present Illness: The patient is a 68-year-old female presents emergency department due to worsening colitis.  She reports that she was here few days ago and diagnosed with colitis.  She reports that she has been having diarrhea.  Initially it was bloody and now it is just mucousy.  Reports that she is having lower abdominal pain.  She is been taking antibiotics as prescribed.  Her main concern is she feels very lightheaded and is scared she is going to pass out as she lives at home alone.  Intermittently is getting fevers.  She reports that she does have pain to her rectum and she believes that her hemorrhoids are out.  Her daughter-in-law is at bedside.  She is no further complaints.    Brief Physical Exam:  Exam is limited by the patient sitting in a chair in triage.   Heart: Regular rate and rhythm.   Lungs: Clear to auscultation bilaterally.   Abdomen: Soft, nondistended, normoactive bowel sounds.  Lower abdominal tenderness without peritonitis.    Plan: Appropriate labs and diagnostic imaging were ordered.      For the remainder of the patient's workup and ED course, please refer to the main ED provider note. We discussed need for diagnostic testing including laboratory studies and imaging.  We also discussed that they may be asked to wait in the waiting room while these tests are pending.  They understand that if they choose to leave without having the testing completed or resulted that we cannot rule out acute life threatening illnesses and the risks involved could lead to worsening condition, permanent disability or even death.      Disclaimer: This note was dictated by speech recognition. Minor errors in transcription may be present. Please call if questions.

## 2024-04-04 PROBLEM — R10.84 GENERALIZED ABDOMINAL PAIN: Status: ACTIVE | Noted: 2024-04-04

## 2024-04-04 LAB
ALBUMIN SERPL BCP-MCNC: 2.9 G/DL (ref 3.4–5)
ANION GAP SERPL CALC-SCNC: 9 MMOL/L (ref 10–20)
BUN SERPL-MCNC: 12 MG/DL (ref 6–23)
C DIF TOX TCDA+TCDB STL QL NAA+PROBE: DETECTED
C DIFF TOX A+B STL QL IA: NEGATIVE
CALCIUM SERPL-MCNC: 8.1 MG/DL (ref 8.6–10.3)
CHLORIDE SERPL-SCNC: 112 MMOL/L (ref 98–107)
CO2 SERPL-SCNC: 22 MMOL/L (ref 21–32)
CREAT SERPL-MCNC: 0.85 MG/DL (ref 0.5–1.05)
EGFRCR SERPLBLD CKD-EPI 2021: 74 ML/MIN/1.73M*2
ERYTHROCYTE [DISTWIDTH] IN BLOOD BY AUTOMATED COUNT: 14.6 % (ref 11.5–14.5)
GLUCOSE SERPL-MCNC: 93 MG/DL (ref 74–99)
HCT VFR BLD AUTO: 32.9 % (ref 36–46)
HGB BLD-MCNC: 10.6 G/DL (ref 12–16)
MAGNESIUM SERPL-MCNC: 1.91 MG/DL (ref 1.6–2.4)
MCH RBC QN AUTO: 32.6 PG (ref 26–34)
MCHC RBC AUTO-ENTMCNC: 32.2 G/DL (ref 32–36)
MCV RBC AUTO: 101 FL (ref 80–100)
NRBC BLD-RTO: 0 /100 WBCS (ref 0–0)
PHOSPHATE SERPL-MCNC: 2.3 MG/DL (ref 2.5–4.9)
PLATELET # BLD AUTO: 454 X10*3/UL (ref 150–450)
POTASSIUM SERPL-SCNC: 3.4 MMOL/L (ref 3.5–5.3)
RBC # BLD AUTO: 3.25 X10*6/UL (ref 4–5.2)
SODIUM SERPL-SCNC: 140 MMOL/L (ref 136–145)
WBC # BLD AUTO: 7 X10*3/UL (ref 4.4–11.3)

## 2024-04-04 PROCEDURE — 2500000004 HC RX 250 GENERAL PHARMACY W/ HCPCS (ALT 636 FOR OP/ED)

## 2024-04-04 PROCEDURE — 2500000005 HC RX 250 GENERAL PHARMACY W/O HCPCS

## 2024-04-04 PROCEDURE — 99222 1ST HOSP IP/OBS MODERATE 55: CPT

## 2024-04-04 PROCEDURE — 85027 COMPLETE CBC AUTOMATED: CPT

## 2024-04-04 PROCEDURE — 83735 ASSAY OF MAGNESIUM: CPT

## 2024-04-04 PROCEDURE — 2500000001 HC RX 250 WO HCPCS SELF ADMINISTERED DRUGS (ALT 637 FOR MEDICARE OP)

## 2024-04-04 PROCEDURE — 80069 RENAL FUNCTION PANEL: CPT

## 2024-04-04 PROCEDURE — 36415 COLL VENOUS BLD VENIPUNCTURE: CPT

## 2024-04-04 PROCEDURE — 1100000001 HC PRIVATE ROOM DAILY

## 2024-04-04 RX ORDER — POTASSIUM CHLORIDE 1.5 G/1.58G
20 POWDER, FOR SOLUTION ORAL ONCE
Status: COMPLETED | OUTPATIENT
Start: 2024-04-04 | End: 2024-04-04

## 2024-04-04 RX ADMIN — LISINOPRIL 40 MG: 40 TABLET ORAL at 09:17

## 2024-04-04 RX ADMIN — AMLODIPINE BESYLATE 2.5 MG: 5 TABLET ORAL at 09:17

## 2024-04-04 RX ADMIN — ACETAMINOPHEN 650 MG: 325 TABLET ORAL at 14:10

## 2024-04-04 RX ADMIN — PIPERACILLIN SODIUM AND TAZOBACTAM SODIUM 3.38 G: 3; .375 INJECTION, SOLUTION INTRAVENOUS at 09:50

## 2024-04-04 RX ADMIN — POTASSIUM CHLORIDE 20 MEQ: 1.5 POWDER, FOR SOLUTION ORAL at 09:17

## 2024-04-04 RX ADMIN — PIPERACILLIN SODIUM AND TAZOBACTAM SODIUM 3.38 G: 3; .375 INJECTION, SOLUTION INTRAVENOUS at 17:37

## 2024-04-04 RX ADMIN — PIPERACILLIN SODIUM AND TAZOBACTAM SODIUM 3.38 G: 3; .375 INJECTION, SOLUTION INTRAVENOUS at 22:43

## 2024-04-04 RX ADMIN — ENOXAPARIN SODIUM 40 MG: 40 INJECTION SUBCUTANEOUS at 17:37

## 2024-04-04 RX ADMIN — ESCITALOPRAM OXALATE 10 MG: 10 TABLET ORAL at 09:17

## 2024-04-04 RX ADMIN — PIPERACILLIN SODIUM AND TAZOBACTAM SODIUM 3.38 G: 3; .375 INJECTION, SOLUTION INTRAVENOUS at 03:45

## 2024-04-04 RX ADMIN — ONDANSETRON 4 MG: 2 INJECTION INTRAMUSCULAR; INTRAVENOUS at 00:42

## 2024-04-04 RX ADMIN — Medication 3 MG: at 22:43

## 2024-04-04 RX ADMIN — POTASSIUM PHOSPHATE, MONOBASIC POTASSIUM PHOSPHATE, DIBASIC 21 MMOL: 224; 236 INJECTION, SOLUTION, CONCENTRATE INTRAVENOUS at 10:45

## 2024-04-04 ASSESSMENT — PAIN SCALES - GENERAL
PAINLEVEL_OUTOF10: 0 - NO PAIN
PAINLEVEL_OUTOF10: 0 - NO PAIN
PAINLEVEL_OUTOF10: 3
PAINLEVEL_OUTOF10: 4

## 2024-04-04 NOTE — CARE PLAN
Problem: Pain  Goal: My pain/discomfort is manageable  Outcome: Progressing     Problem: Safety  Goal: Patient will be injury free during hospitalization  Outcome: Progressing  Goal: I will remain free of falls  Outcome: Progressing     Problem: Daily Care  Goal: Daily care needs are met  Outcome: Progressing     Problem: Psychosocial Needs  Goal: Demonstrates ability to cope with hospitalization/illness  Outcome: Progressing  Goal: Collaborate with me, my family, and caregiver to identify my specific goals  Outcome: Progressing     Problem: Discharge Barriers  Goal: My discharge needs are met  Outcome: Progressing   The patient's goals for the shift include      The clinical goals for the shift include Pt will not complain of abdominal pain during shift    Over the shift, the patient did not make progress toward the following goals. Barriers to progression include patient is still experiencing abdominal pain. Recommendations to address these barriers include remind patient to ask for pain medication when in pain.

## 2024-04-04 NOTE — PROGRESS NOTES
Toma Lane is a 68 y.o. female on day 0 of admission presenting with Colitis.    SUBJECTIVE    Patient was seen and examined at bedside.  She notes that she was continued to have episodes of stool and notes that usually happens almost 10 minutes after she eats.  She feels like sometimes she is unable to make it to the bathroom in time before she has a bowel movement.  Denies any bloody stools at this time.  Denies fevers, chills, nausea, vomiting, shortness of breath, chest pain.    OBJECTIVE    Vitals:    04/03/24 1855 04/03/24 1857 04/03/24 2120 04/04/24 0545   BP: 142/90 142/90 101/59 111/59   BP Location:   Left arm Left arm   Patient Position: Standing Standing Lying Lying   Pulse:   63 57   Resp:   17 18   Temp:   37.5 °C (99.5 °F) 37.1 °C (98.8 °F)   TempSrc:   Temporal Temporal   SpO2:   93% 94%   Weight:       Height:          Results from last 7 days   Lab Units 04/04/24  0646 04/03/24  1225   WBC AUTO x10*3/uL 7.0 8.9   HEMOGLOBIN g/dL 10.6* 12.8   HEMATOCRIT % 32.9* 37.2   PLATELETS AUTO x10*3/uL 454* 479*   NEUTROS PCT AUTO %  --  66.5   LYMPHS PCT AUTO %  --  18.7   MONOS PCT AUTO %  --  12.6   EOS PCT AUTO %  --  0.4     Results from last 7 days   Lab Units 04/04/24  0646 04/03/24  1225   SODIUM mmol/L 140 139   POTASSIUM mmol/L 3.4* 3.6   CHLORIDE mmol/L 112* 107   CO2 mmol/L 22 20*   BUN mg/dL 12 17   CREATININE mg/dL 0.85 0.96   CALCIUM mg/dL 8.1* 9.0   PROTEIN TOTAL g/dL  --  6.1*   BILIRUBIN TOTAL mg/dL  --  0.3   ALK PHOS U/L  --  63   ALT U/L  --  11   AST U/L  --  12   GLUCOSE mg/dL 93 81       Scheduled Medications  amLODIPine, 2.5 mg, oral, Daily  enoxaparin, 40 mg, subcutaneous, q24h  escitalopram, 10 mg, oral, Daily  fluticasone, 2 spray, Each Nostril, Daily  lisinopril, 40 mg, oral, Daily  piperacillin-tazobactam, 3.375 g, intravenous, q6h  potassium chloride, 20 mEq, oral, Once  potassium phosphate, 21 mmol, intravenous, Once           Physical Exam  HENT:      Head: Normocephalic.       Mouth/Throat:      Mouth: Mucous membranes are dry.   Eyes:      Pupils: Pupils are equal, round, and reactive to light.   Cardiovascular:      Rate and Rhythm: Normal rate and regular rhythm.   Pulmonary:      Effort: Pulmonary effort is normal.      Breath sounds: Normal breath sounds.   Abdominal:      Tenderness: There is abdominal tenderness (Mild tenderness in lower left and lower right quadrant).   Genitourinary:     Comments: 2 cm x 2 cm hemorrhoid On lateral side of the anus  Musculoskeletal:         General: Normal range of motion.      Cervical back: Normal range of motion.   Skin:     General: Skin is warm and dry.   Neurological:      General: No focal deficit present.      Mental Status: She is alert.   Psychiatric:         Mood and Affect: Mood normal.          ASSESSMENT & PLAN    Daily Progress    4/4: Continue current management.  Stool pathogen PCR and C. difficile pending      Colitis, infectious versus inflammatory  External hemorrhoids  PLAN:  -Patient was receiving antibiotics ciprofloxacin and Flagyl in outpatient setting for about 3 days.  Will start patient on IV Zosyn start date 4/3  -Sitz bath ordered for external hemorrhoids  -Patient denies any history of C. difficile however given her episodes of diarrhea and antibiotic use, will get a C. difficile and stool PCR testing     Hypomagnesemia  Hypokalemia  Dehydration likely secondary to diarrhea  PLAN:  -Maintain potassium greater than 4, magnesium greater than 2  -Patient on IV fluids  cc an hour.     Hypertension  PLAN:  -Continue patient's home medications amlodipine and lisinopril     Chronic medical conditions  Anxiety/depression: Continue Lexapro  Plaque psoriasis  Raynaud's     DVT ppx: Lovenox  GI ppx: Protonix  IVF:  cc an hour  Diet: Regular  Consults: None    Eddie Mcqueen, DO  PGY-2, Internal Medicine   Please SecureChat for any further questions  This is a preliminary note, please await attending attestation  for final A/P

## 2024-04-04 NOTE — PROGRESS NOTES
Physical Therapy                 Therapy Communication Note    Patient Name: Toma Lane  MRN: [id number]  Today's Date: 4/4/2024     Discipline: Physical Therapy    Missed Visit Reason: Missed Visit Reason:  (PTscreen performed:pt observed to be independent w/moblity,gait steady w/o use of device,self maintains balance w/ changes in direction & maneuvering environmental obstacles.  Family support, pt w/ no concerns for discharge home.PT to sign off,pt agrees.)

## 2024-04-04 NOTE — PROGRESS NOTES
Occupational Therapy                                     Screen/Discharge  Patient Name: Toma Lane  MRN: [id number]  Today's Date: 4/4/2024     Discipline: Occupational Therapy    Missed Visit Reason:  O.T. screen; patient was observed to be independent transfers, mobility & toileting; patient reports no concerns to manage ADLs at home; Discharge O.T. orders secondary to above.

## 2024-04-05 LAB
ALBUMIN SERPL BCP-MCNC: 2.8 G/DL (ref 3.4–5)
ANION GAP SERPL CALC-SCNC: 10 MMOL/L (ref 10–20)
ATRIAL RATE: 66 BPM
BUN SERPL-MCNC: 9 MG/DL (ref 6–23)
C COLI+JEJ+UPSA DNA STL QL NAA+PROBE: NOT DETECTED
CALCIUM SERPL-MCNC: 8.2 MG/DL (ref 8.6–10.3)
CHLORIDE SERPL-SCNC: 111 MMOL/L (ref 98–107)
CO2 SERPL-SCNC: 24 MMOL/L (ref 21–32)
CREAT SERPL-MCNC: 0.88 MG/DL (ref 0.5–1.05)
EC STX1 GENE STL QL NAA+PROBE: NOT DETECTED
EC STX2 GENE STL QL NAA+PROBE: NOT DETECTED
EGFRCR SERPLBLD CKD-EPI 2021: 71 ML/MIN/1.73M*2
ERYTHROCYTE [DISTWIDTH] IN BLOOD BY AUTOMATED COUNT: 14.5 % (ref 11.5–14.5)
GLUCOSE SERPL-MCNC: 101 MG/DL (ref 74–99)
HCT VFR BLD AUTO: 32.7 % (ref 36–46)
HGB BLD-MCNC: 11.2 G/DL (ref 12–16)
MAGNESIUM SERPL-MCNC: 1.64 MG/DL (ref 1.6–2.4)
MCH RBC QN AUTO: 33.9 PG (ref 26–34)
MCHC RBC AUTO-ENTMCNC: 34.3 G/DL (ref 32–36)
MCV RBC AUTO: 99 FL (ref 80–100)
NOROVIRUS GI + GII RNA STL NAA+PROBE: NOT DETECTED
NRBC BLD-RTO: 0 /100 WBCS (ref 0–0)
P AXIS: 76 DEGREES
P OFFSET: 173 MS
P ONSET: 110 MS
PHOSPHATE SERPL-MCNC: 2.5 MG/DL (ref 2.5–4.9)
PLATELET # BLD AUTO: 433 X10*3/UL (ref 150–450)
POTASSIUM SERPL-SCNC: 3.6 MMOL/L (ref 3.5–5.3)
PR INTERVAL: 222 MS
Q ONSET: 221 MS
QRS COUNT: 11 BEATS
QRS DURATION: 128 MS
QT INTERVAL: 386 MS
QTC CALCULATION(BAZETT): 404 MS
QTC FREDERICIA: 398 MS
R AXIS: 34 DEGREES
RBC # BLD AUTO: 3.3 X10*6/UL (ref 4–5.2)
RV RNA STL NAA+PROBE: NOT DETECTED
SALMONELLA DNA STL QL NAA+PROBE: NOT DETECTED
SHIGELLA DNA SPEC QL NAA+PROBE: NOT DETECTED
SODIUM SERPL-SCNC: 141 MMOL/L (ref 136–145)
T AXIS: 36 DEGREES
T OFFSET: 414 MS
V CHOLERAE DNA STL QL NAA+PROBE: NOT DETECTED
VENTRICULAR RATE: 66 BPM
WBC # BLD AUTO: 8.9 X10*3/UL (ref 4.4–11.3)
Y ENTEROCOL DNA STL QL NAA+PROBE: NOT DETECTED

## 2024-04-05 PROCEDURE — 83735 ASSAY OF MAGNESIUM: CPT

## 2024-04-05 PROCEDURE — 1100000001 HC PRIVATE ROOM DAILY

## 2024-04-05 PROCEDURE — 2500000002 HC RX 250 W HCPCS SELF ADMINISTERED DRUGS (ALT 637 FOR MEDICARE OP, ALT 636 FOR OP/ED): Mod: MUE

## 2024-04-05 PROCEDURE — 36415 COLL VENOUS BLD VENIPUNCTURE: CPT

## 2024-04-05 PROCEDURE — 2500000004 HC RX 250 GENERAL PHARMACY W/ HCPCS (ALT 636 FOR OP/ED)

## 2024-04-05 PROCEDURE — 80069 RENAL FUNCTION PANEL: CPT

## 2024-04-05 PROCEDURE — 85027 COMPLETE CBC AUTOMATED: CPT

## 2024-04-05 PROCEDURE — 2500000001 HC RX 250 WO HCPCS SELF ADMINISTERED DRUGS (ALT 637 FOR MEDICARE OP)

## 2024-04-05 PROCEDURE — 99232 SBSQ HOSP IP/OBS MODERATE 35: CPT

## 2024-04-05 RX ORDER — HYDROCORTISONE 25 MG/G
OINTMENT TOPICAL 2 TIMES DAILY
Status: DISCONTINUED | OUTPATIENT
Start: 2024-04-05 | End: 2024-04-06 | Stop reason: HOSPADM

## 2024-04-05 RX ORDER — VANCOMYCIN HYDROCHLORIDE 125 MG/1
125 CAPSULE ORAL 4 TIMES DAILY
Status: DISCONTINUED | OUTPATIENT
Start: 2024-04-05 | End: 2024-04-06 | Stop reason: HOSPADM

## 2024-04-05 RX ADMIN — VANCOMYCIN HYDROCHLORIDE 125 MG: 125 CAPSULE ORAL at 23:05

## 2024-04-05 RX ADMIN — ACETAMINOPHEN 650 MG: 325 TABLET ORAL at 23:12

## 2024-04-05 RX ADMIN — HYDROCORTISONE: 25 OINTMENT TOPICAL at 12:37

## 2024-04-05 RX ADMIN — VANCOMYCIN HYDROCHLORIDE 125 MG: 125 CAPSULE ORAL at 09:18

## 2024-04-05 RX ADMIN — ENOXAPARIN SODIUM 40 MG: 40 INJECTION SUBCUTANEOUS at 17:36

## 2024-04-05 RX ADMIN — LISINOPRIL 40 MG: 40 TABLET ORAL at 09:13

## 2024-04-05 RX ADMIN — ESCITALOPRAM OXALATE 10 MG: 10 TABLET ORAL at 09:12

## 2024-04-05 RX ADMIN — VANCOMYCIN HYDROCHLORIDE 125 MG: 125 CAPSULE ORAL at 12:37

## 2024-04-05 RX ADMIN — PIPERACILLIN SODIUM AND TAZOBACTAM SODIUM 3.38 G: 3; .375 INJECTION, SOLUTION INTRAVENOUS at 04:22

## 2024-04-05 RX ADMIN — AMLODIPINE BESYLATE 2.5 MG: 5 TABLET ORAL at 09:12

## 2024-04-05 RX ADMIN — VANCOMYCIN HYDROCHLORIDE 125 MG: 125 CAPSULE ORAL at 17:36

## 2024-04-05 ASSESSMENT — PAIN DESCRIPTION - LOCATION: LOCATION: NECK

## 2024-04-05 ASSESSMENT — PAIN - FUNCTIONAL ASSESSMENT
PAIN_FUNCTIONAL_ASSESSMENT: 0-10
PAIN_FUNCTIONAL_ASSESSMENT: 0-10

## 2024-04-05 ASSESSMENT — PAIN SCALES - GENERAL
PAINLEVEL_OUTOF10: 0 - NO PAIN
PAINLEVEL_OUTOF10: 5 - MODERATE PAIN
PAINLEVEL_OUTOF10: 0 - NO PAIN

## 2024-04-05 NOTE — PROGRESS NOTES
Toma Lane is a 68 y.o. female on day 1 of admission presenting with Colitis.    SUBJECTIVE    Seen and examined at bedside.  Patient noted she is having about 6 bowel movements a day is still having pain due to her hemorrhoids.  Discussed with nurse about ordering a sitz bath today to help with the symptoms.  Denies fevers, chills, nausea, vomiting, shortness of breath, chest pain    OBJECTIVE    Vitals:    04/04/24 0545 04/04/24 1320 04/05/24 0021 04/05/24 0552   BP: 111/59 124/61 116/65 119/61   BP Location: Left arm      Patient Position: Lying      Pulse: 57 63 62 50   Resp: 18   18   Temp: 37.1 °C (98.8 °F) 36 °C (96.8 °F) 35.9 °C (96.6 °F) 36.9 °C (98.4 °F)   TempSrc: Temporal      SpO2: 94% 93% 94% 96%   Weight:       Height:          Results from last 7 days   Lab Units 04/05/24  0642 04/04/24  0646 04/03/24  1225   WBC AUTO x10*3/uL 8.9   < > 8.9   HEMOGLOBIN g/dL 11.2*   < > 12.8   HEMATOCRIT % 32.7*   < > 37.2   PLATELETS AUTO x10*3/uL 433   < > 479*   NEUTROS PCT AUTO %  --   --  66.5   LYMPHS PCT AUTO %  --   --  18.7   MONOS PCT AUTO %  --   --  12.6   EOS PCT AUTO %  --   --  0.4    < > = values in this interval not displayed.       Results from last 7 days   Lab Units 04/05/24  0642 04/04/24  0646 04/03/24  1225   SODIUM mmol/L 141   < > 139   POTASSIUM mmol/L 3.6   < > 3.6   CHLORIDE mmol/L 111*   < > 107   CO2 mmol/L 24   < > 20*   BUN mg/dL 9   < > 17   CREATININE mg/dL 0.88   < > 0.96   CALCIUM mg/dL 8.2*   < > 9.0   PROTEIN TOTAL g/dL  --   --  6.1*   BILIRUBIN TOTAL mg/dL  --   --  0.3   ALK PHOS U/L  --   --  63   ALT U/L  --   --  11   AST U/L  --   --  12   GLUCOSE mg/dL 101*   < > 81    < > = values in this interval not displayed.         Scheduled Medications  amLODIPine, 2.5 mg, oral, Daily  enoxaparin, 40 mg, subcutaneous, q24h  escitalopram, 10 mg, oral, Daily  fluticasone, 2 spray, Each Nostril, Daily  lisinopril, 40 mg, oral, Daily  vancomycin, 125 mg, oral, 4x daily            Physical Exam  HENT:      Head: Normocephalic.      Mouth/Throat:      Mouth: Mucous membranes are dry.   Eyes:      Pupils: Pupils are equal, round, and reactive to light.   Cardiovascular:      Rate and Rhythm: Normal rate and regular rhythm.   Pulmonary:      Effort: Pulmonary effort is normal.      Breath sounds: Normal breath sounds.   Abdominal:      Tenderness: There is abdominal tenderness (Mild tenderness in lower left and lower right quadrant).   Genitourinary:     Comments: 2 cm x 2 cm hemorrhoid On lateral side of the anus  Musculoskeletal:         General: Normal range of motion.      Cervical back: Normal range of motion.   Skin:     General: Skin is warm and dry.   Neurological:      General: No focal deficit present.      Mental Status: She is alert.   Psychiatric:         Mood and Affect: Mood normal.          ASSESSMENT & PLAN    Daily Progress    4/4: Continue current management.  Stool pathogen PCR and C. difficile pending      Colitis, Suspicion for C. difficile based on stool pathogen PCR  External hemorrhoids  PLAN:  -Stopped IV antibiotics and transition patient to oral vancomycin  -Patient is positive for C. difficile on her PCR but negative for C. difficile toxin.  Patient never had any prior history of C. difficile but did have a course of amoxicillin about a month ago for a dental procedure.  For now we will treat this as C. difficile and give patient 10 days of oral vancomycin  -Sitz bath  -Hydrocortisone cream 2.5% to the rectum BID      Hypomagnesemia  Hypokalemia  Dehydration likely secondary to diarrhea  PLAN:  -Maintain potassium greater than 4, magnesium greater than 2  -Patient on IV fluids  cc an hour.     Hypertension  PLAN:  -Continue patient's home medications amlodipine and lisinopril     Chronic medical conditions  Anxiety/depression: Continue Lexapro  Plaque psoriasis  Raynaud's     DVT ppx: Lovenox  GI ppx: Protonix  IVF: -  Diet: Regular  Consults:  None    Eddie Mcqueen, DO  PGY-2, Internal Medicine   Please SecureChat for any further questions  This is a preliminary note, please await attending attestation for final A/P

## 2024-04-05 NOTE — PROGRESS NOTES
04/05/24 1602   Discharge Planning   Living Arrangements Alone   Support Systems Family members   Assistance Needed None   Type of Residence Private residence   Number of Stairs to Enter Residence 1   Number of Stairs Within Residence 3   Who is requesting discharge planning? Patient   Home or Post Acute Services None   Patient expects to be discharged to: Home   Does the patient need discharge transport arranged? No     TCC Note: Pt admitted from Home Alone. Pt to return back home with No needs. Please consult Care Transitions in the event of change. Brigitte Awad, MSN, RN, TCC.

## 2024-04-05 NOTE — CARE PLAN
The clinical goals for the shift include Pt. will have decreased amount of stools    Patient met this goal. Patient stable overnight.      Problem: Daily Care  Goal: Daily care needs are met  Outcome: Progressing     Problem: Psychosocial Needs  Goal: Demonstrates ability to cope with hospitalization/illness  Outcome: Progressing  Goal: Collaborate with me, my family, and caregiver to identify my specific goals  Outcome: Progressing     Problem: Discharge Barriers  Goal: My discharge needs are met  Outcome: Progressing     Problem: Pain  Goal: My pain/discomfort is manageable  Outcome: Met     Problem: Safety  Goal: Patient will be injury free during hospitalization  Outcome: Met  Goal: I will remain free of falls  Outcome: Met

## 2024-04-06 VITALS
WEIGHT: 140 LBS | RESPIRATION RATE: 16 BRPM | BODY MASS INDEX: 25.76 KG/M2 | HEART RATE: 63 BPM | OXYGEN SATURATION: 95 % | TEMPERATURE: 98.1 F | SYSTOLIC BLOOD PRESSURE: 127 MMHG | DIASTOLIC BLOOD PRESSURE: 72 MMHG | HEIGHT: 62 IN

## 2024-04-06 PROBLEM — R53.1 WEAKNESS: Status: ACTIVE | Noted: 2024-04-06

## 2024-04-06 LAB
ALBUMIN SERPL BCP-MCNC: 2.9 G/DL (ref 3.4–5)
ANION GAP SERPL CALC-SCNC: 11 MMOL/L (ref 10–20)
ATRIAL RATE: 71 BPM
BUN SERPL-MCNC: 11 MG/DL (ref 6–23)
CALCIUM SERPL-MCNC: 8.6 MG/DL (ref 8.6–10.3)
CHLORIDE SERPL-SCNC: 111 MMOL/L (ref 98–107)
CO2 SERPL-SCNC: 24 MMOL/L (ref 21–32)
CREAT SERPL-MCNC: 0.77 MG/DL (ref 0.5–1.05)
EGFRCR SERPLBLD CKD-EPI 2021: 84 ML/MIN/1.73M*2
ERYTHROCYTE [DISTWIDTH] IN BLOOD BY AUTOMATED COUNT: 14.5 % (ref 11.5–14.5)
GLUCOSE SERPL-MCNC: 94 MG/DL (ref 74–99)
HCT VFR BLD AUTO: 34.8 % (ref 36–46)
HGB BLD-MCNC: 11.5 G/DL (ref 12–16)
MAGNESIUM SERPL-MCNC: 1.62 MG/DL (ref 1.6–2.4)
MCH RBC QN AUTO: 32.8 PG (ref 26–34)
MCHC RBC AUTO-ENTMCNC: 33 G/DL (ref 32–36)
MCV RBC AUTO: 99 FL (ref 80–100)
NRBC BLD-RTO: 0 /100 WBCS (ref 0–0)
P AXIS: 80 DEGREES
PHOSPHATE SERPL-MCNC: 2.5 MG/DL (ref 2.5–4.9)
PLATELET # BLD AUTO: 443 X10*3/UL (ref 150–450)
POTASSIUM SERPL-SCNC: 3.6 MMOL/L (ref 3.5–5.3)
PR INTERVAL: 209 MS
Q ONSET: 249 MS
QRS COUNT: 12 BEATS
QRS DURATION: 143 MS
QT INTERVAL: 423 MS
QTC CALCULATION(BAZETT): 460 MS
QTC FREDERICIA: 447 MS
R AXIS: 30 DEGREES
RBC # BLD AUTO: 3.51 X10*6/UL (ref 4–5.2)
SODIUM SERPL-SCNC: 142 MMOL/L (ref 136–145)
T AXIS: 13 DEGREES
T OFFSET: 461 MS
VENTRICULAR RATE: 71 BPM
WBC # BLD AUTO: 9 X10*3/UL (ref 4.4–11.3)

## 2024-04-06 PROCEDURE — 2500000002 HC RX 250 W HCPCS SELF ADMINISTERED DRUGS (ALT 637 FOR MEDICARE OP, ALT 636 FOR OP/ED)

## 2024-04-06 PROCEDURE — 36415 COLL VENOUS BLD VENIPUNCTURE: CPT

## 2024-04-06 PROCEDURE — 80069 RENAL FUNCTION PANEL: CPT

## 2024-04-06 PROCEDURE — 2500000001 HC RX 250 WO HCPCS SELF ADMINISTERED DRUGS (ALT 637 FOR MEDICARE OP)

## 2024-04-06 PROCEDURE — 85027 COMPLETE CBC AUTOMATED: CPT

## 2024-04-06 PROCEDURE — 83735 ASSAY OF MAGNESIUM: CPT

## 2024-04-06 PROCEDURE — 99238 HOSP IP/OBS DSCHRG MGMT 30/<: CPT | Performed by: STUDENT IN AN ORGANIZED HEALTH CARE EDUCATION/TRAINING PROGRAM

## 2024-04-06 RX ORDER — VANCOMYCIN HYDROCHLORIDE 125 MG/1
125 CAPSULE ORAL 4 TIMES DAILY
Qty: 36 CAPSULE | Refills: 0 | Status: SHIPPED | OUTPATIENT
Start: 2024-04-06 | End: 2024-04-15

## 2024-04-06 RX ORDER — HYDROCORTISONE 25 MG/G
OINTMENT TOPICAL 2 TIMES DAILY
Qty: 40 G | Refills: 1 | Status: SHIPPED | OUTPATIENT
Start: 2024-04-06

## 2024-04-06 RX ADMIN — AMLODIPINE BESYLATE 2.5 MG: 5 TABLET ORAL at 10:16

## 2024-04-06 RX ADMIN — VANCOMYCIN HYDROCHLORIDE 125 MG: 125 CAPSULE ORAL at 06:13

## 2024-04-06 RX ADMIN — VANCOMYCIN HYDROCHLORIDE 125 MG: 125 CAPSULE ORAL at 13:15

## 2024-04-06 RX ADMIN — LISINOPRIL 40 MG: 40 TABLET ORAL at 10:16

## 2024-04-06 RX ADMIN — ESCITALOPRAM OXALATE 10 MG: 10 TABLET ORAL at 10:16

## 2024-04-06 RX ADMIN — HYDROCORTISONE: 25 OINTMENT TOPICAL at 10:17

## 2024-04-06 ASSESSMENT — COGNITIVE AND FUNCTIONAL STATUS - GENERAL
MOBILITY SCORE: 24
DAILY ACTIVITIY SCORE: 24

## 2024-04-06 ASSESSMENT — PAIN - FUNCTIONAL ASSESSMENT: PAIN_FUNCTIONAL_ASSESSMENT: 0-10

## 2024-04-06 ASSESSMENT — PAIN SCALES - GENERAL: PAINLEVEL_OUTOF10: 0 - NO PAIN

## 2024-04-06 NOTE — NURSING NOTE
Patient's IV was removed. Patient dressed & waiting for son to pick her up. DC paperwork went over with patient, no questions regarding her care & new medications. [facility] already texted her that her prescription is ready for . Will put in for transport to take patient down when her son gets here.

## 2024-04-06 NOTE — CARE PLAN
The patient's goals for the shift include      The clinical goals for the shift include pt will remain safe and free from injury throughout the shift    Over the shift, the patient did not make progress toward the following goals. Barriers to progression include acute illness. Recommendations to address these barriers include comunication.

## 2024-04-06 NOTE — DISCHARGE SUMMARY
Discharge Diagnosis  Colitis    Issues Requiring Follow-Up  Complete course of oral vancomycin for Cdiff  Screening colonoscopy later this year  Possible follow-up with general surgery for consideration of banding of hemorrhoids     Discharge Meds     Your medication list        START taking these medications        Instructions Last Dose Given Next Dose Due   hydrocortisone 2.5 % ointment      Apply topically 2 times a day.       vancomycin 125 mg capsule  Commonly known as: Vancocin      Take 1 capsule (125 mg) by mouth 4 times a day for 9 days.              CONTINUE taking these medications        Instructions Last Dose Given Next Dose Due   amLODIPine 2.5 mg tablet  Commonly known as: Norvasc      Take 1 tablet (2.5 mg) by mouth once daily.       betamethasone valerate 0.1 % cream  Commonly known as: Valisone      APPLY TWICE A DAY TOPICALLY TO PLAQUES ON BODY       clobetasol 0.05 % external solution  Commonly known as: Temovate           clobetasol 0.05 % cream  Commonly known as: Temovate      APPLY TWICE A DAY TOPICALLY TO ALL PLAQUE AREAS-USE FOR 2 WKS,THEN STEP DOWN TO BETAMETHASONE CREAM       escitalopram 10 mg tablet  Commonly known as: Lexapro      Take 1 tablet (10 mg) by mouth once daily.       lisinopril 40 mg tablet      Take 1 tablet (40 mg) by mouth once daily.       loratadine 10 mg tablet  Commonly known as: Claritin      Take 1 tablet (10 mg) by mouth once daily as needed for allergies.       LORazepam 0.5 mg tablet  Commonly known as: Ativan      Take 1 tablet (0.5 mg) by mouth once daily as needed for anxiety for up to 10 days.              STOP taking these medications      ciprofloxacin 500 mg tablet  Commonly known as: Cipro        fluticasone 50 mcg/actuation nasal spray  Commonly known as: Flonase        metroNIDAZOLE 500 mg tablet  Commonly known as: Flagyl                  Where to Get Your Medications        These medications were sent to [facility]/pharmacy #[id number] - [id number] [id number] [facility]  [address], [facility] [address]      Phone: [phone]   hydrocortisone 2.5 % ointment  vancomycin 125 mg capsule         Test Results Pending At Discharge  Pending Labs       No current pending labs.            Hospital Course   Patient is a 68-year-old female with medical history significant for anxiety, hypertension on lisinopril 40 mg, amlodipine 2.5 mg, plaque psoriasis, Raynaud's presenting to [facility] due to episodes of diarrhea.  Patient noted that symptoms started on Friday and she was having recurrent episodes of diarrhea and she noted them to bloody.  She initially followed up with her PCP and was on antibiotics for colitis however symptoms did continue to persist and she presented to the emergency department on Monday.  CT imaging on Monday showed patient likely had colitis with no history of C. difficile and was discharged with Cipro and Flagyl.  Patient still continues to have bouts of diarrhea however she noted that they have mostly been mucus-like consistency now since she got discharged from the emergency department 2 days ago.  Patient noted that she has been having about 5-10 episodes of diarrhea daily and because she lives alone she has been very concerned about the symptoms especially because she feels very lightheaded and dehydrated from these recurrent episodes of diarrhea.  Patient denies any fevers, chills, nausea, vomiting, shortness of breath, chest pain, numbness or tingling.  Patient also denies any recent sick contacts, any history of C. difficile.  She did note that a couple weeks ago she was on amoxicillin per her dentist however did not have any episodes of diarrhea while she was on her antibiotic course or after.  Symptoms mostly started since Friday according to the patient.  Additionally, patient did endorse that she has history of hemorrhoids and feels like her hemorrhoid is \"flaring up\" and has been causing her a lot of pain.   "   In the emergency department, patient is hemodynamically stable.  Labs significant for potassium 3.6, bicarb 30, magnesium 1.5.  CT imaging on 4/2 shows infectious versus inflammatory colitis involving the sigmoid and rectum.  There is also diffuse enlargement of the adrenal gland suggestive of hypertrophy however there is nodular heterogenicity particularly in the right adrenal can with recommendation to follow-up with a adrenal MRI or CT outpatient.\    Hospital course: Cdiff PCR was hospital and toxin was negative. Zosyn was discontinued and was started on oral vancomycin. The next day, had some improvement in the consistency and frequency of bowel movements. To complete 10 day course of oral vancomycin     Pertinent Physical Exam At Time of Discharge  Physical Exam  General: No acute distress  HEENT: EOMI  CV: Regular rate and rhythm, normal S1 and S2, no murmurs  Pulm: Clear to auscultation bilaterally, no wheezings, rales or rhonchi  Abd: Nondistended  MSK: 5/5 strength in all extremities  Skin: No rashes   Lymphatic: No lymphadenopathy        Outpatient Follow-Up  Future Appointments   Date Time Provider Department Center   6/11/2024  3:20 PM Dima Hinkle MD [id number] [facility]   6/12/2024 11:30 AM William Simeon MD [id number] [place]         William Simeon MD

## 2024-04-06 NOTE — CARE PLAN
The patient's goals for the shift include      The clinical goals for the shift include Pt will have less bowel movements throughout shift.

## 2024-04-08 ENCOUNTER — PATIENT OUTREACH (OUTPATIENT)
Dept: PRIMARY CARE | Facility: CLINIC | Age: 69
End: 2024-04-08
Payer: MEDICARE

## 2024-04-08 NOTE — PROGRESS NOTES
Discharge Facility:   [facility]  Discharge Diagnosis: C-DIFF   Admission Date:  4/3/24   Discharge Date:  4/6/24       PCP Appointment Date:   6/12/24   Specialist Appointment Date:  N/A   Hospital Encounter and Summary: Linked   See discharge assessment below for further details        Issues Requiring Follow-Up  Complete course of oral vancomycin for Cdiff  Screening colonoscopy later this year  Possible follow-up with general surgery for consideration of banding of hemorrhoids    Engagement  Call Start Time: 1250 (4/8/2024 12:58 PM)    Medications  Medications reviewed with patient/caregiver?: Yes (4/8/2024 12:58 PM)  Is the patient having any side effects they believe may be caused by any medication additions or changes?: No (4/8/2024 12:58 PM)  Does the patient have all medications ordered at discharge?: Yes (4/8/2024 12:58 PM)  Prescription Comments: NEW SCRIPT FOR vanoc and hydrocort cream (4/8/2024 12:58 PM)  Medication Comments: Pt denies problems obtaining or affording medication (4/8/2024 12:58 PM)    Appointments  Does the patient have a primary care provider?: Yes (4/8/2024 12:58 PM)  Care Management Interventions: Advised to reschedule appointment (4/8/2024 12:58 PM)    Self Management  What is the home health agency?: DENIES NEED (4/8/2024 12:58 PM)    Patient Teaching  Does the patient have access to their discharge instructions?: Yes (4/8/2024 12:58 PM)  Care Management Interventions: Reviewed instructions with patient (4/8/2024 12:58 PM)  What is the patient's perception of their health status since discharge?: Improving (4/8/2024 12:58 PM)  Is the patient/caregiver able to teach back the hierarchy of who to call/visit for symptoms/problems? PCP, Specialist, Home Health nurse, Urgent Care, ED, 911: Yes (4/8/2024 12:58 PM)  Patient/Caregiver Education Comments: Successful transition of care outreach with patient. Pt reports doing well at home since discharge. New meds reviewed. Pt  denies CP and SOB. Patient denies any further discharge questions/needs at this time. Emphasized that Follow up is needed after discharge to review the hospital recommendations, assess your response to your treatment.  Pt aware of my availability for non-emergent concerns. Contact info provided to patient. pt states she will keep appt for june with pcp. (4/8/2024 12:58 PM)

## 2024-04-09 ENCOUNTER — TELEPHONE (OUTPATIENT)
Dept: PRIMARY CARE | Facility: CLINIC | Age: 69
End: 2024-04-09
Payer: MEDICARE

## 2024-04-15 ENCOUNTER — OFFICE VISIT (OUTPATIENT)
Dept: PRIMARY CARE | Facility: CLINIC | Age: 69
End: 2024-04-15
Payer: MEDICARE

## 2024-04-15 VITALS
HEIGHT: 62 IN | BODY MASS INDEX: 27.6 KG/M2 | SYSTOLIC BLOOD PRESSURE: 125 MMHG | DIASTOLIC BLOOD PRESSURE: 80 MMHG | WEIGHT: 150 LBS

## 2024-04-15 DIAGNOSIS — Z76.89 ENCOUNTER FOR SUPPORT AND COORDINATION OF TRANSITION OF CARE: Primary | ICD-10-CM

## 2024-04-15 DIAGNOSIS — A04.72 C. DIFFICILE COLITIS: ICD-10-CM

## 2024-04-15 PROCEDURE — 3074F SYST BP LT 130 MM HG: CPT | Performed by: STUDENT IN AN ORGANIZED HEALTH CARE EDUCATION/TRAINING PROGRAM

## 2024-04-15 PROCEDURE — 3079F DIAST BP 80-89 MM HG: CPT | Performed by: STUDENT IN AN ORGANIZED HEALTH CARE EDUCATION/TRAINING PROGRAM

## 2024-04-15 PROCEDURE — 1160F RVW MEDS BY RX/DR IN RCRD: CPT | Performed by: STUDENT IN AN ORGANIZED HEALTH CARE EDUCATION/TRAINING PROGRAM

## 2024-04-15 PROCEDURE — 1111F DSCHRG MED/CURRENT MED MERGE: CPT | Performed by: STUDENT IN AN ORGANIZED HEALTH CARE EDUCATION/TRAINING PROGRAM

## 2024-04-15 PROCEDURE — 1159F MED LIST DOCD IN RCRD: CPT | Performed by: STUDENT IN AN ORGANIZED HEALTH CARE EDUCATION/TRAINING PROGRAM

## 2024-04-15 PROCEDURE — 99213 OFFICE O/P EST LOW 20 MIN: CPT | Performed by: STUDENT IN AN ORGANIZED HEALTH CARE EDUCATION/TRAINING PROGRAM

## 2024-04-15 NOTE — PROGRESS NOTES
"Subjective   Patient ID: Toma Lane is a 68 y.o. female who presents for Hospital Follow-up (04/03/24 admitted for Cdiff; she is feeling better now ).    HPI   Toma is here for hospital follow up.     Initially presented to the ED with diarrhea, was disgnosed with colitis and discharged on cipro/flagyl. Diarrhea persisted/worsened Admitted from 4/3/24-4/6/24 for c-diff colitis. She has been taking PO vancomycin, will complete course tomorrow. She is having formed stool for the last ~1 week.  No hospitalization prior to diarrhea starting, but she had recently completed a course of amoxicillin prior to dental work.     She is feeling much improved and is without complaint at this time. She has all of the medications that she needs and has regular follow up with her established physicians.     Review of Systems    8 point review of systems is otherwise negative unless mentioned on HPI      Objective   /80   Ht 1.575 m (5' 2\")   Wt 68 kg (150 lb)   BMI 27.44 kg/m²     Physical Exam  GEN: conversant, NAD  HEENT: PERRL, EOMI  NECK: supple, full, no carotid bruits appreciated bilaterally  CV: S1, S2, regular, no murmur  PULM: CTAB  ABD: soft, NT, ND  EXT: bilateral 1+ pitting LE edema  NEURO: no gross focal deficits  PSYCH: appropriate affect     Assessment/Plan   #transitional care management  #c-diff colitis  -Reviewed recent hospitalization, including: labwork, imaging, documentation and reconciled current medications with patient  - Will complete course of PO vancmycin tomorrow    #bilateral lower extremity swelling  #bilateral LE varicose veins  - +1 pitting edema  - counseled low salt diet, elevation throughout the day as able and compression stocking use when on feet for long period of time    RTC as scheduled, sooner PRN    Sebastian Haddad, DO      "

## 2024-04-22 ENCOUNTER — PATIENT OUTREACH (OUTPATIENT)
Dept: PRIMARY CARE | Facility: CLINIC | Age: 69
End: 2024-04-22
Payer: MEDICARE

## 2024-04-22 NOTE — PROGRESS NOTES
Unable to reach patient for call back after patient's follow up appointment with PCP.   CITLALIM with call back number for patient to call if needed   If no voicemail available call attempts x 2 were made to contact the patient to assist with any questions or concerns patient may have.

## 2024-05-22 ENCOUNTER — PATIENT OUTREACH (OUTPATIENT)
Dept: PRIMARY CARE | Facility: CLINIC | Age: 69
End: 2024-05-22
Payer: MEDICARE

## 2024-05-22 NOTE — PROGRESS NOTES
"Successful outreach to patient regarding hospitalization as patient continues TCM program.   At time of outreach call the patient feels as if their condition has improved since initial visit with PCP or specialist. Pt reports she ddid have a \"set back\" with loose stool. Pt change eating to \"colitis diet\" and is feeling better. No longer having loose stools. No pain no fever.  Questions or concerns addressed at this time with patient.   Provided contact information to patient if any further non-emergent needs arise.    "

## 2024-06-11 ENCOUNTER — APPOINTMENT (OUTPATIENT)
Dept: DERMATOLOGY | Facility: CLINIC | Age: 69
End: 2024-06-11
Payer: MEDICARE

## 2024-06-11 DIAGNOSIS — L40.0 PLAQUE PSORIASIS: Primary | ICD-10-CM

## 2024-06-11 PROCEDURE — 99213 OFFICE O/P EST LOW 20 MIN: CPT | Performed by: SPECIALIST

## 2024-06-11 RX ORDER — BETAMETHASONE VALERATE 1 MG/G
CREAM TOPICAL
Qty: 45 G | Refills: 3 | Status: SHIPPED | OUTPATIENT
Start: 2024-06-11

## 2024-06-11 RX ORDER — TAPINAROF 10 MG/1000MG
1 CREAM TOPICAL DAILY
Qty: 60 G | Refills: 2 | Status: SHIPPED | OUTPATIENT
Start: 2024-06-11 | End: 2024-07-11

## 2024-06-11 RX ORDER — CLOBETASOL PROPIONATE 0.5 MG/G
CREAM TOPICAL
Qty: 60 G | Refills: 1 | Status: SHIPPED | OUTPATIENT
Start: 2024-06-11

## 2024-06-11 NOTE — PROGRESS NOTES
Subjective   HPI: Toma Lane is a 68 y.o. female is here for evaluation and treatment of my psoriasis.  .     ROS: No other skin or systemic complaints other than what is documented elsewhere in the note.    ALLERGIES: Patient has no known allergies.    SOCIAL:  reports that she has been smoking cigarettes. She uses smokeless tobacco. She reports current alcohol use. She reports that she does not use drugs.    Objective     Description: Patient has large active plaques of psoriasis involving her arms.  Patient has been under stress due to the death of her  on Shahriar Day.    Assessment/Plan   1. Plaque psoriasis    Related Medications  amLODIPine (Norvasc) 2.5 mg tablet  Take 1 tablet (2.5 mg) by mouth once daily.    tapinarof (Vtama) 1 % cream  Apply 1 Application topically once daily. Apply a thin layer to the affected area once daily.    clobetasol (Temovate) 0.05 % cream  APPLY TWICE A DAY TOPICALLY TO ALL PLAQUE AREAS-USE FOR 2 WKS,THEN STEP DOWN TO BETAMETHASONE CREAM    betamethasone valerate (Valisone) 0.1 % cream  APPLY TWICE A DAY TOPICALLY TO PLAQUES ON BODY       Plan: I rediscussed psoriasis and stress.  FOLLOW UP: Refill clobetasol cream to be used twice daily to active areas.  Patient can start  where he is goingVTAMA to other individual areas and assess if clobetasol or VTAMA is more effective for her.    The patient was encouraged to contact me with any further questions or concerns.  Dima Hinkle MD  6/11/2024

## 2024-06-12 ENCOUNTER — APPOINTMENT (OUTPATIENT)
Dept: PRIMARY CARE | Facility: CLINIC | Age: 69
End: 2024-06-12
Payer: MEDICARE

## 2024-06-12 VITALS — SYSTOLIC BLOOD PRESSURE: 102 MMHG | WEIGHT: 135 LBS | DIASTOLIC BLOOD PRESSURE: 80 MMHG | BODY MASS INDEX: 24.69 KG/M2

## 2024-06-12 DIAGNOSIS — J30.2 SEASONAL ALLERGIES: ICD-10-CM

## 2024-06-12 DIAGNOSIS — Z12.11 COLON CANCER SCREENING: Primary | ICD-10-CM

## 2024-06-12 DIAGNOSIS — I10 HYPERTENSION, UNSPECIFIED TYPE: ICD-10-CM

## 2024-06-12 PROCEDURE — 3079F DIAST BP 80-89 MM HG: CPT | Performed by: STUDENT IN AN ORGANIZED HEALTH CARE EDUCATION/TRAINING PROGRAM

## 2024-06-12 PROCEDURE — G2211 COMPLEX E/M VISIT ADD ON: HCPCS | Performed by: STUDENT IN AN ORGANIZED HEALTH CARE EDUCATION/TRAINING PROGRAM

## 2024-06-12 PROCEDURE — 3074F SYST BP LT 130 MM HG: CPT | Performed by: STUDENT IN AN ORGANIZED HEALTH CARE EDUCATION/TRAINING PROGRAM

## 2024-06-12 PROCEDURE — 99213 OFFICE O/P EST LOW 20 MIN: CPT | Performed by: STUDENT IN AN ORGANIZED HEALTH CARE EDUCATION/TRAINING PROGRAM

## 2024-06-12 RX ORDER — LORATADINE 10 MG/1
10 TABLET ORAL DAILY PRN
Qty: 90 TABLET | Refills: 1 | Status: SHIPPED | OUTPATIENT
Start: 2024-06-12

## 2024-06-12 RX ORDER — FLUTICASONE PROPIONATE 50 MCG
1 SPRAY, SUSPENSION (ML) NASAL DAILY
COMMUNITY
End: 2024-06-12 | Stop reason: SDUPTHER

## 2024-06-12 RX ORDER — LISINOPRIL 40 MG/1
40 TABLET ORAL DAILY
Qty: 90 TABLET | Refills: 1 | Status: SHIPPED | OUTPATIENT
Start: 2024-06-12

## 2024-06-12 RX ORDER — FLUTICASONE PROPIONATE 50 MCG
1 SPRAY, SUSPENSION (ML) NASAL DAILY
Qty: 16 G | Refills: 3 | Status: SHIPPED | OUTPATIENT
Start: 2024-06-12

## 2024-06-12 NOTE — PROGRESS NOTES
Follow up and med refill and referral on colonoscopy    Subjective   Patient ID: Toma Lane is a 68 y.o. female who presents for Follow-up, Med Refill, and Referral (colonoscopy).    HPI     Presents for follow-up.  After hospitalizations for diarrhea was positive for C. difficile had completed course of oral vancomycin.  Did have some return of looser stools.  Had put her self on a colitis diet with improvement.  Due for colonoscopy later this year.    Review of Systems    8 point review of systems is otherwise negative unless mentioned on HPI      Objective   /80   Wt 61.2 kg (135 lb)   BMI 24.69 kg/m²     Physical Exam    General: No acute distress  HEENT: EOMI  CV: Regular rate and rhythm, normal S1 and S2, no murmurs  Pulm: Clear to auscultation bilaterally, no wheezings, rales or rhonchi  Abd: Nondistended  MSK: 5/5 strength in all extremities  Lymphatic: No lymphadenopathy      Assessment/Plan       #History of C. Difficile  -Completed course of oral vancomycin  -Did have some return of looser stools afterwards, she did put herself on a colitis diet which has had resolution.    #Grieving  #Anxiety  -Previously prescribed very brief course of lorazepam as needed.  OARRS report reviewed.  Previously prescribed Lexapro     #HTN  -Continue lisinopril 40mg daily  -Continue amlodipine 2.5mg daily     #Plaque psoriasis  -Follows with dermatology, was initially started on Otezla that was discontinued for side effects, had been considered for injectable medications, were cost prohibitive. Currently on 2 different steroid creams that are alternated every 2 weeks. Has had some improvement     #Raynaud's  -Has had some improvement since starting amlodipine, has noted some LE edema although has not been bothersome enough to stop the amlodipine     #H/O hand numbness with positive phalen's test  -Offered to order EMG when is more bothersome      #Tobacco use  -Discussed decreasing/quitting, still working on  decreasing use over time     #Rhinorrhea  -Continue flonase and loratadine.      #Health maintenance  -Last colonoscopy at age 58, told no issues and due to repeat at 68, discussed 12/2023, would like to consider in early 2024, ordered 5/2024  -Mammogram/ultrasound 2/2023  -Follows with GYN  -Received Covid vaccine and booster  -Previously recommended Pneumovax, will consider  -Recommended shingrix     RTC 6 months      This note was dictated by speech recognition. Minor errors in transcription may be present.

## 2024-06-14 ENCOUNTER — APPOINTMENT (OUTPATIENT)
Dept: PRIMARY CARE | Facility: CLINIC | Age: 69
End: 2024-06-14
Payer: MEDICARE

## 2024-06-26 ENCOUNTER — PATIENT OUTREACH (OUTPATIENT)
Dept: PRIMARY CARE | Facility: CLINIC | Age: 69
End: 2024-06-26
Payer: MEDICARE

## 2024-07-05 DIAGNOSIS — J30.2 SEASONAL ALLERGIES: ICD-10-CM

## 2024-07-05 RX ORDER — FLUTICASONE PROPIONATE 50 MCG
1 SPRAY, SUSPENSION (ML) NASAL DAILY
Qty: 48 ML | Refills: 2 | Status: SHIPPED | OUTPATIENT
Start: 2024-07-05

## 2024-08-05 DIAGNOSIS — L40.0 PLAQUE PSORIASIS: ICD-10-CM

## 2024-08-08 RX ORDER — CLOBETASOL PROPIONATE 0.5 MG/G
CREAM TOPICAL
Qty: 60 G | Refills: 0 | Status: SHIPPED | OUTPATIENT
Start: 2024-08-08

## 2024-08-12 ENCOUNTER — APPOINTMENT (OUTPATIENT)
Dept: DERMATOLOGY | Facility: CLINIC | Age: 69
End: 2024-08-12
Payer: MEDICARE

## 2024-09-10 ENCOUNTER — TELEPHONE (OUTPATIENT)
Dept: DERMATOLOGY | Facility: CLINIC | Age: 69
End: 2024-09-10
Payer: MEDICARE

## 2024-09-10 NOTE — TELEPHONE ENCOUNTER
Received fax from [facility] for rf of clobetasol cr ,, fro Dr rodriguez office and MIGEL GOFF -- I called pt to make her aware she would need to make appt for any further rfs -- pt said she is going to ask her PCP to give rx to her and she will call to make appt soon ..

## 2024-12-07 DIAGNOSIS — I10 HYPERTENSION, UNSPECIFIED TYPE: ICD-10-CM

## 2024-12-10 ENCOUNTER — APPOINTMENT (OUTPATIENT)
Dept: PRIMARY CARE | Facility: CLINIC | Age: 69
End: 2024-12-10
Payer: MEDICARE

## 2024-12-10 VITALS
HEART RATE: 80 BPM | WEIGHT: 129 LBS | DIASTOLIC BLOOD PRESSURE: 77 MMHG | OXYGEN SATURATION: 97 % | BODY MASS INDEX: 23.59 KG/M2 | SYSTOLIC BLOOD PRESSURE: 147 MMHG

## 2024-12-10 DIAGNOSIS — J30.2 SEASONAL ALLERGIES: ICD-10-CM

## 2024-12-10 DIAGNOSIS — Z12.39 ENCOUNTER FOR SCREENING FOR MALIGNANT NEOPLASM OF BREAST, UNSPECIFIED SCREENING MODALITY: Primary | ICD-10-CM

## 2024-12-10 DIAGNOSIS — I10 HYPERTENSION, UNSPECIFIED TYPE: ICD-10-CM

## 2024-12-10 DIAGNOSIS — Z12.31 ENCOUNTER FOR SCREENING MAMMOGRAM FOR MALIGNANT NEOPLASM OF BREAST: ICD-10-CM

## 2024-12-10 PROCEDURE — 3078F DIAST BP <80 MM HG: CPT | Performed by: STUDENT IN AN ORGANIZED HEALTH CARE EDUCATION/TRAINING PROGRAM

## 2024-12-10 PROCEDURE — G2211 COMPLEX E/M VISIT ADD ON: HCPCS | Performed by: STUDENT IN AN ORGANIZED HEALTH CARE EDUCATION/TRAINING PROGRAM

## 2024-12-10 PROCEDURE — 1159F MED LIST DOCD IN RCRD: CPT | Performed by: STUDENT IN AN ORGANIZED HEALTH CARE EDUCATION/TRAINING PROGRAM

## 2024-12-10 PROCEDURE — 99213 OFFICE O/P EST LOW 20 MIN: CPT | Performed by: STUDENT IN AN ORGANIZED HEALTH CARE EDUCATION/TRAINING PROGRAM

## 2024-12-10 PROCEDURE — 3077F SYST BP >= 140 MM HG: CPT | Performed by: STUDENT IN AN ORGANIZED HEALTH CARE EDUCATION/TRAINING PROGRAM

## 2024-12-10 RX ORDER — LISINOPRIL 40 MG/1
40 TABLET ORAL DAILY
Qty: 90 TABLET | Refills: 1 | Status: SHIPPED | OUTPATIENT
Start: 2024-12-10

## 2024-12-10 RX ORDER — LORATADINE 10 MG/1
10 TABLET ORAL DAILY PRN
Qty: 90 TABLET | Refills: 1 | Status: SHIPPED | OUTPATIENT
Start: 2024-12-10

## 2024-12-10 RX ORDER — LISINOPRIL 40 MG/1
40 TABLET ORAL DAILY
Qty: 90 TABLET | Refills: 1 | OUTPATIENT
Start: 2024-12-10

## 2024-12-10 NOTE — PROGRESS NOTES
Subjective   Patient ID: Toma Lane is a 69 y.o. female who presents for Follow-up.    HPI     Presents for follow-up and medication refill.  Stress has continued to be higher due to some ongoing family situations.  Dermatologist had recently retired.  When deviates from colitis diet will have some return of diarrhea    Review of Systems    8 point review of systems is otherwise negative unless mentioned on HPI      Objective   /77   Pulse 80   Wt 58.5 kg (129 lb)   SpO2 97%   BMI 23.59 kg/m²     Physical Exam  Constitutional:       General: She is not in acute distress.  Eyes:      Extraocular Movements: Extraocular movements intact.   Cardiovascular:      Rate and Rhythm: Normal rate and regular rhythm.      Heart sounds: No murmur heard.  Pulmonary:      Breath sounds: No wheezing.   Abdominal:      Palpations: Abdomen is soft.      Tenderness: There is no abdominal tenderness.   Musculoskeletal:         General: No swelling.   Skin:     Comments: Areas of psoriasis on hands and forearms   Psychiatric:         Mood and Affect: Mood normal.       Assessment/Plan       #History of C. Difficile  -Completed course of oral vancomycin  -Did have some return of looser stools afterwards, she did put herself on a colitis diet which has had resolution.     #Grieving  #Anxiety  -Previously prescribed very brief course of lorazepam as needed.  OARRS report reviewed.  Previously prescribed Lexapro     #HTN  -Continue lisinopril 40mg daily  -Continue amlodipine 2.5mg daily     #Plaque psoriasis  -Follows with dermatology, was initially started on Otezla that was discontinued for side effects, had been considered for injectable medications, were cost prohibitive. Currently on 2 different steroid creams that are alternated every 2 weeks. Has had some improvement     #Raynaud's  -Has had some improvement since starting amlodipine, has noted some LE edema although has not been bothersome enough to stop the  amlodipine     #H/O hand numbness with positive phalen's test  -Offered to order EMG when is more bothersome      #Tobacco use  -Discussed decreasing/quitting, still working on decreasing use over time     #Rhinorrhea  -Continue flonase and loratadine.      #Health maintenance  -Last colonoscopy at age 58, told no issues and due to repeat at 68, discussed 12/2023, would like to consider in early 2024, ordered 5/2024 and reminded 12/2024  -Mammogram/ultrasound 2/2023, given requisition 12/2024  -Follows with GYN  -Received Covid vaccine and booster  -Previously recommended Pneumovax, will consider  -Recommended shingrix     RTC 6 months      This note was dictated by speech recognition. Minor errors in transcription may be present.

## 2025-03-10 ENCOUNTER — HOSPITAL ENCOUNTER (OUTPATIENT)
Dept: RADIOLOGY | Facility: CLINIC | Age: 70
Discharge: HOME | End: 2025-03-10
Payer: MEDICARE

## 2025-03-10 DIAGNOSIS — Z12.39 ENCOUNTER FOR SCREENING FOR MALIGNANT NEOPLASM OF BREAST, UNSPECIFIED SCREENING MODALITY: ICD-10-CM

## 2025-03-10 DIAGNOSIS — Z12.31 ENCOUNTER FOR SCREENING MAMMOGRAM FOR MALIGNANT NEOPLASM OF BREAST: ICD-10-CM

## 2025-03-10 PROCEDURE — 77063 BREAST TOMOSYNTHESIS BI: CPT | Performed by: RADIOLOGY

## 2025-03-10 PROCEDURE — 77063 BREAST TOMOSYNTHESIS BI: CPT

## 2025-03-10 PROCEDURE — 77067 SCR MAMMO BI INCL CAD: CPT | Performed by: RADIOLOGY

## 2025-03-11 DIAGNOSIS — Z12.39 ENCOUNTER FOR SCREENING FOR MALIGNANT NEOPLASM OF BREAST, UNSPECIFIED SCREENING MODALITY: Primary | ICD-10-CM

## 2025-03-11 DIAGNOSIS — Z12.11 COLON CANCER SCREENING: Primary | ICD-10-CM

## 2025-03-11 DIAGNOSIS — R92.2 INCONCLUSIVE MAMMOGRAM: ICD-10-CM

## 2025-03-11 RX ORDER — POLYETHYLENE GLYCOL 3350, SODIUM SULFATE ANHYDROUS, SODIUM BICARBONATE, SODIUM CHLORIDE, POTASSIUM CHLORIDE 236; 22.74; 6.74; 5.86; 2.97 G/4L; G/4L; G/4L; G/4L; G/4L
4 POWDER, FOR SOLUTION ORAL ONCE
Qty: 4000 ML | Refills: 0 | Status: SHIPPED | OUTPATIENT
Start: 2025-03-11 | End: 2025-03-11

## 2025-03-18 ENCOUNTER — TELEPHONE (OUTPATIENT)
Dept: PRIMARY CARE | Facility: CLINIC | Age: 70
End: 2025-03-18
Payer: MEDICARE

## 2025-03-21 ENCOUNTER — HOSPITAL ENCOUNTER (OUTPATIENT)
Dept: GASTROENTEROLOGY | Facility: HOSPITAL | Age: 70
Discharge: HOME | End: 2025-03-21
Payer: MEDICARE

## 2025-03-21 ENCOUNTER — ANESTHESIA EVENT (OUTPATIENT)
Dept: GASTROENTEROLOGY | Facility: HOSPITAL | Age: 70
End: 2025-03-21
Payer: MEDICARE

## 2025-03-21 ENCOUNTER — ANESTHESIA (OUTPATIENT)
Dept: GASTROENTEROLOGY | Facility: HOSPITAL | Age: 70
End: 2025-03-21
Payer: MEDICARE

## 2025-03-21 VITALS
WEIGHT: 128.97 LBS | RESPIRATION RATE: 16 BRPM | SYSTOLIC BLOOD PRESSURE: 101 MMHG | TEMPERATURE: 96.8 F | HEART RATE: 89 BPM | DIASTOLIC BLOOD PRESSURE: 55 MMHG | OXYGEN SATURATION: 98 % | HEIGHT: 62 IN | BODY MASS INDEX: 23.73 KG/M2

## 2025-03-21 DIAGNOSIS — Z12.11 COLON CANCER SCREENING: ICD-10-CM

## 2025-03-21 PROCEDURE — 45378 DIAGNOSTIC COLONOSCOPY: CPT | Performed by: SURGERY

## 2025-03-21 PROCEDURE — 7100000010 HC PHASE TWO TIME - EACH INCREMENTAL 1 MINUTE: Performed by: SURGERY

## 2025-03-21 PROCEDURE — 3700000001 HC GENERAL ANESTHESIA TIME - INITIAL BASE CHARGE: Performed by: SURGERY

## 2025-03-21 PROCEDURE — 3700000002 HC GENERAL ANESTHESIA TIME - EACH INCREMENTAL 1 MINUTE: Performed by: SURGERY

## 2025-03-21 PROCEDURE — 2500000004 HC RX 250 GENERAL PHARMACY W/ HCPCS (ALT 636 FOR OP/ED): Performed by: ANESTHESIOLOGIST ASSISTANT

## 2025-03-21 PROCEDURE — G0121 COLON CA SCRN NOT HI RSK IND: HCPCS | Performed by: SURGERY

## 2025-03-21 PROCEDURE — 2500000005 HC RX 250 GENERAL PHARMACY W/O HCPCS: Performed by: ANESTHESIOLOGIST ASSISTANT

## 2025-03-21 PROCEDURE — 7100000009 HC PHASE TWO TIME - INITIAL BASE CHARGE: Performed by: SURGERY

## 2025-03-21 PROCEDURE — 2500000002 HC RX 250 W HCPCS SELF ADMINISTERED DRUGS (ALT 637 FOR MEDICARE OP, ALT 636 FOR OP/ED): Performed by: ANESTHESIOLOGY

## 2025-03-21 RX ORDER — IPRATROPIUM BROMIDE AND ALBUTEROL SULFATE 2.5; .5 MG/3ML; MG/3ML
3 SOLUTION RESPIRATORY (INHALATION)
Status: DISCONTINUED | OUTPATIENT
Start: 2025-03-21 | End: 2025-03-22 | Stop reason: HOSPADM

## 2025-03-21 RX ORDER — IPRATROPIUM BROMIDE AND ALBUTEROL SULFATE 2.5; .5 MG/3ML; MG/3ML
SOLUTION RESPIRATORY (INHALATION)
Status: DISPENSED
Start: 2025-03-21 | End: 2025-03-21

## 2025-03-21 RX ORDER — PROPOFOL 10 MG/ML
INJECTION, EMULSION INTRAVENOUS AS NEEDED
Status: DISCONTINUED | OUTPATIENT
Start: 2025-03-21 | End: 2025-03-21

## 2025-03-21 RX ORDER — LIDOCAINE HCL/PF 100 MG/5ML
SYRINGE (ML) INTRAVENOUS AS NEEDED
Status: DISCONTINUED | OUTPATIENT
Start: 2025-03-21 | End: 2025-03-21

## 2025-03-21 RX ORDER — NORETHINDRONE AND ETHINYL ESTRADIOL 0.5-0.035
KIT ORAL AS NEEDED
Status: DISCONTINUED | OUTPATIENT
Start: 2025-03-21 | End: 2025-03-21

## 2025-03-21 RX ADMIN — PROPOFOL 100 MCG/KG/MIN: 10 INJECTION, EMULSION INTRAVENOUS at 09:17

## 2025-03-21 RX ADMIN — IPRATROPIUM BROMIDE AND ALBUTEROL SULFATE 3 ML: .5; 3 SOLUTION RESPIRATORY (INHALATION) at 08:10

## 2025-03-21 RX ADMIN — PROPOFOL 50 MG: 10 INJECTION, EMULSION INTRAVENOUS at 09:19

## 2025-03-21 RX ADMIN — LIDOCAINE HYDROCHLORIDE 50 MG: 20 INJECTION, SOLUTION INTRAVENOUS at 09:16

## 2025-03-21 RX ADMIN — EPHEDRINE SULFATE 10 MG: 50 INJECTION, SOLUTION INTRAVENOUS at 09:37

## 2025-03-21 RX ADMIN — PROPOFOL 70 MG: 10 INJECTION, EMULSION INTRAVENOUS at 09:16

## 2025-03-21 SDOH — HEALTH STABILITY: MENTAL HEALTH: CURRENT SMOKER: 1

## 2025-03-21 ASSESSMENT — ENCOUNTER SYMPTOMS
ARTHRALGIAS: 0
APPETITE CHANGE: 0
BLOOD IN STOOL: 0
DYSURIA: 0
LIGHT-HEADEDNESS: 0
SHORTNESS OF BREATH: 0
EYE PAIN: 0
EYE REDNESS: 0
DIARRHEA: 0
HEADACHES: 0
HEMATURIA: 0
CONSTIPATION: 0
CONFUSION: 0
COUGH: 0
ABDOMINAL PAIN: 0
ACTIVITY CHANGE: 0

## 2025-03-21 ASSESSMENT — COLUMBIA-SUICIDE SEVERITY RATING SCALE - C-SSRS
1. IN THE PAST MONTH, HAVE YOU WISHED YOU WERE DEAD OR WISHED YOU COULD GO TO SLEEP AND NOT WAKE UP?: NO
2. HAVE YOU ACTUALLY HAD ANY THOUGHTS OF KILLING YOURSELF?: NO
6. HAVE YOU EVER DONE ANYTHING, STARTED TO DO ANYTHING, OR PREPARED TO DO ANYTHING TO END YOUR LIFE?: NO

## 2025-03-21 ASSESSMENT — PAIN - FUNCTIONAL ASSESSMENT: PAIN_FUNCTIONAL_ASSESSMENT: 0-10

## 2025-03-21 ASSESSMENT — PAIN SCALES - GENERAL
PAINLEVEL_OUTOF10: 0 - NO PAIN

## 2025-03-21 NOTE — ANESTHESIA POSTPROCEDURE EVALUATION
Patient: Toma Lane    Procedure Summary       Date: 03/21/25 Room / Location: [facility]    Anesthesia Start: 0913 Anesthesia Stop: 0952    Procedure: COLONOSCOPY Diagnosis: Colon cancer screening    Scheduled Providers: Alexis Zhang MD PhD Responsible Provider: Dima Cabello MD    Anesthesia Type: MAC ASA Status: 2            Anesthesia Type: MAC    Vitals Value Taken Time   BP 90/56 03/21/25 0951   Temp 36.9 03/21/25 0952   Pulse 93 03/21/25 0951   Resp 14 03/21/25 0952   SpO2 89 % 03/21/25 0951   Vitals shown include unfiled device data.    Anesthesia Post Evaluation    Patient participation: complete - patient participated  Level of consciousness: awake  Pain management: adequate  Airway patency: patent  Cardiovascular status: acceptable  Respiratory status: acceptable  Hydration status: acceptable  Postoperative Nausea and Vomiting: none        No notable events documented.

## 2025-03-21 NOTE — ANESTHESIA PREPROCEDURE EVALUATION
Patient: Toma Lane    Procedure Information       Date/Time: 03/21/25 0910    Scheduled providers: Alexis Zhang MD PhD    Procedure: COLONOSCOPY    Location: [facility]            Relevant Problems   Anesthesia (within normal limits)      Cardiac   (+) Essential hypertension   (+) Hypertension      ID   (+) Tinea unguium      Skin   (+) Lichen simplex chronicus   (+) Rash       Clinical information reviewed:    Allergies  Meds               NPO Detail:  NPO/Void Status  Date of Last Liquid: 03/21/25  Time of Last Liquid: 0530  Date of Last Solid: 03/19/25  Time of Last Solid: 2000         Physical Exam    Airway  Mallampati: I  TM distance: >3 FB  Neck ROM: full     Cardiovascular - normal exam     Dental   (+) upper dentures, lower dentures     Pulmonary   (+) wheezes     Abdominal - normal exam             Anesthesia Plan    History of general anesthesia?: yes  History of complications of general anesthesia?: no    ASA 2     MAC     The patient is a current smoker.  Patient was previously instructed to abstain from smoking on day of procedure.  Patient smoked on day of procedure.    intravenous induction   Anesthetic plan and risks discussed with patient.  Use of blood products discussed with patient who consented to blood products.    Plan discussed with CRNA.

## 2025-03-21 NOTE — H&P
History Of Present Illness  Toma Lane is a 69 y.o. female with a history of HTN who presents for screening colonoscopy. She states that she last had a colonoscopy 10 years ago and does not recall any biopsies or polyps resected at this time. She denies any family history of colon cancer or other GI malignancies. She denies any recent changes in health or medications.      Past Medical History  No past medical history on file.    Surgical History  Past Surgical History:   Procedure Laterality Date    KNEE SURGERY  09/11/2018    Knee Surgery    OTHER SURGICAL HISTORY  01/25/2022    Ovarian cystectomy        Social History  She reports that she has been smoking cigarettes. She uses smokeless tobacco. She reports current alcohol use. She reports that she does not use drugs.    Family History  No family history on file.     Allergies  Patient has no known allergies.    Review of Systems   Constitutional:  Negative for activity change and appetite change.   HENT:  Negative for nosebleeds.    Eyes:  Negative for pain and redness.   Respiratory:  Negative for cough and shortness of breath.    Cardiovascular:  Negative for chest pain.   Gastrointestinal:  Negative for abdominal pain, blood in stool, constipation and diarrhea.   Genitourinary:  Negative for dysuria and hematuria.   Musculoskeletal:  Negative for arthralgias.   Neurological:  Negative for light-headedness and headaches.   Psychiatric/Behavioral:  Negative for confusion.         Physical Exam  Constitutional:       Appearance: Normal appearance.   HENT:      Mouth/Throat:      Mouth: Mucous membranes are moist.      Pharynx: Oropharynx is clear.   Eyes:      Extraocular Movements: Extraocular movements intact.      Pupils: Pupils are equal, round, and reactive to light.   Cardiovascular:      Rate and Rhythm: Normal rate and regular rhythm.   Pulmonary:      Effort: Pulmonary effort is normal.      Breath sounds: Normal breath sounds.   Abdominal:       "General: There is no distension.      Palpations: Abdomen is soft.      Tenderness: There is no abdominal tenderness.   Musculoskeletal:         General: Normal range of motion.   Skin:     General: Skin is warm and dry.      Findings: Rash present.   Neurological:      General: No focal deficit present.      Mental Status: She is alert and oriented to person, place, and time.   Psychiatric:         Mood and Affect: Mood normal.         Behavior: Behavior normal.          Last Recorded Vitals  Blood pressure 136/73, pulse 66, temperature 36.3 °C (97.3 °F), resp. rate 20, height 1.575 m (5' 2\"), weight 58.5 kg (128 lb 15.5 oz), SpO2 100%.    Relevant Results             Assessment/Plan   Assessment & Plan  Colon cancer screening      Toma Lane is a 69 y.o. female with a history of HTN who presents for screening colonoscopy.    Plan:  - Screening colonoscopy  - Consent obtained after discussion of risks/benefits/alternatives       I spent 15 minutes in the professional and overall care of this patient.      Sergio Marie MD    "

## 2025-03-27 ENCOUNTER — HOSPITAL ENCOUNTER (OUTPATIENT)
Dept: RADIOLOGY | Facility: CLINIC | Age: 70
Discharge: HOME | End: 2025-03-27
Payer: MEDICARE

## 2025-03-27 DIAGNOSIS — Z12.39 ENCOUNTER FOR SCREENING FOR MALIGNANT NEOPLASM OF BREAST, UNSPECIFIED SCREENING MODALITY: ICD-10-CM

## 2025-03-27 DIAGNOSIS — R92.2 INCONCLUSIVE MAMMOGRAM: ICD-10-CM

## 2025-03-27 PROCEDURE — 77065 DX MAMMO INCL CAD UNI: CPT | Mod: RT

## 2025-06-05 DIAGNOSIS — I10 HYPERTENSION, UNSPECIFIED TYPE: ICD-10-CM

## 2025-06-10 ENCOUNTER — APPOINTMENT (OUTPATIENT)
Dept: PRIMARY CARE | Facility: CLINIC | Age: 70
End: 2025-06-10
Payer: MEDICARE

## 2025-06-10 VITALS
HEIGHT: 62 IN | OXYGEN SATURATION: 97 % | HEART RATE: 64 BPM | SYSTOLIC BLOOD PRESSURE: 149 MMHG | DIASTOLIC BLOOD PRESSURE: 81 MMHG | WEIGHT: 129 LBS | BODY MASS INDEX: 23.74 KG/M2

## 2025-06-10 DIAGNOSIS — J30.2 SEASONAL ALLERGIES: ICD-10-CM

## 2025-06-10 DIAGNOSIS — L40.0 PLAQUE PSORIASIS: ICD-10-CM

## 2025-06-10 DIAGNOSIS — I10 HYPERTENSION, UNSPECIFIED TYPE: ICD-10-CM

## 2025-06-10 PROCEDURE — 3077F SYST BP >= 140 MM HG: CPT | Performed by: STUDENT IN AN ORGANIZED HEALTH CARE EDUCATION/TRAINING PROGRAM

## 2025-06-10 PROCEDURE — 1159F MED LIST DOCD IN RCRD: CPT | Performed by: STUDENT IN AN ORGANIZED HEALTH CARE EDUCATION/TRAINING PROGRAM

## 2025-06-10 PROCEDURE — 3079F DIAST BP 80-89 MM HG: CPT | Performed by: STUDENT IN AN ORGANIZED HEALTH CARE EDUCATION/TRAINING PROGRAM

## 2025-06-10 PROCEDURE — G2211 COMPLEX E/M VISIT ADD ON: HCPCS | Performed by: STUDENT IN AN ORGANIZED HEALTH CARE EDUCATION/TRAINING PROGRAM

## 2025-06-10 PROCEDURE — 99213 OFFICE O/P EST LOW 20 MIN: CPT | Performed by: STUDENT IN AN ORGANIZED HEALTH CARE EDUCATION/TRAINING PROGRAM

## 2025-06-10 PROCEDURE — 1160F RVW MEDS BY RX/DR IN RCRD: CPT | Performed by: STUDENT IN AN ORGANIZED HEALTH CARE EDUCATION/TRAINING PROGRAM

## 2025-06-10 PROCEDURE — 3008F BODY MASS INDEX DOCD: CPT | Performed by: STUDENT IN AN ORGANIZED HEALTH CARE EDUCATION/TRAINING PROGRAM

## 2025-06-10 RX ORDER — BETAMETHASONE VALERATE 1 MG/G
CREAM TOPICAL
Qty: 45 G | Refills: 3 | Status: SHIPPED | OUTPATIENT
Start: 2025-06-10

## 2025-06-10 RX ORDER — AMLODIPINE BESYLATE 2.5 MG/1
2.5 TABLET ORAL DAILY
Qty: 90 TABLET | Refills: 1 | Status: SHIPPED | OUTPATIENT
Start: 2025-06-10

## 2025-06-10 RX ORDER — LISINOPRIL 40 MG/1
40 TABLET ORAL DAILY
Qty: 90 TABLET | Refills: 1 | OUTPATIENT
Start: 2025-06-10

## 2025-06-10 RX ORDER — LISINOPRIL 40 MG/1
40 TABLET ORAL DAILY
Qty: 90 TABLET | Refills: 1 | Status: SHIPPED | OUTPATIENT
Start: 2025-06-10

## 2025-06-10 RX ORDER — LORATADINE 10 MG/1
10 TABLET ORAL DAILY PRN
Qty: 90 TABLET | Refills: 1 | Status: SHIPPED | OUTPATIENT
Start: 2025-06-10

## 2025-06-10 RX ORDER — CLOBETASOL PROPIONATE 0.5 MG/G
CREAM TOPICAL
Qty: 60 G | Refills: 3 | Status: SHIPPED | OUTPATIENT
Start: 2025-06-10

## 2025-06-10 ASSESSMENT — PATIENT HEALTH QUESTIONNAIRE - PHQ9
SUM OF ALL RESPONSES TO PHQ9 QUESTIONS 1 AND 2: 0
2. FEELING DOWN, DEPRESSED OR HOPELESS: NOT AT ALL
1. LITTLE INTEREST OR PLEASURE IN DOING THINGS: NOT AT ALL

## 2025-06-10 ASSESSMENT — COLUMBIA-SUICIDE SEVERITY RATING SCALE - C-SSRS
6. HAVE YOU EVER DONE ANYTHING, STARTED TO DO ANYTHING, OR PREPARED TO DO ANYTHING TO END YOUR LIFE?: NO
2. HAVE YOU ACTUALLY HAD ANY THOUGHTS OF KILLING YOURSELF?: NO
1. IN THE PAST MONTH, HAVE YOU WISHED YOU WERE DEAD OR WISHED YOU COULD GO TO SLEEP AND NOT WAKE UP?: NO

## 2025-06-10 ASSESSMENT — ENCOUNTER SYMPTOMS
LOSS OF SENSATION IN FEET: 0
OCCASIONAL FEELINGS OF UNSTEADINESS: 0
DEPRESSION: 0

## 2025-06-10 NOTE — PROGRESS NOTES
"Subjective   Patient ID: Toma Lane is a 69 y.o. female who presents for Follow-up (6 month F/U).    HPI     Presents for follow-up and medication refill. Brother unfortunately passed away yesterday and services will be upcoming    Review of Systems    8 point review of systems is otherwise negative unless mentioned on HPI      Objective   /81   Pulse 64   Ht 1.575 m (5' 2\")   Wt 58.5 kg (129 lb)   SpO2 97%   BMI 23.59 kg/m²     Physical Exam  Constitutional:       General: She is not in acute distress.  HENT:      Head: Atraumatic.   Eyes:      Extraocular Movements: Extraocular movements intact.   Cardiovascular:      Rate and Rhythm: Normal rate and regular rhythm.      Heart sounds: No murmur heard.  Pulmonary:      Breath sounds: No wheezing.   Abdominal:      Palpations: Abdomen is soft.      Tenderness: There is no abdominal tenderness.   Musculoskeletal:         General: No swelling.   Neurological:      Mental Status: Mental status is at baseline.   Psychiatric:         Mood and Affect: Mood normal.         Assessment/Plan       #History of C. Difficile  -Completed course of oral vancomycin  -Did have some return of looser stools afterwards, she did put herself on a colitis diet which has had resolution.     #Grieving  #Anxiety  -Previously prescribed very brief course of lorazepam as needed.  OARRS report reviewed.  Previously prescribed Lexapro     #HTN  -Continue lisinopril 40mg daily  -Continue amlodipine 2.5mg daily     #Plaque psoriasis  -Follows with dermatology, was initially started on Otezla that was discontinued for side effects, had been considered for injectable medications, were cost prohibitive. Currently on 2 different steroid creams that are alternated every 2 weeks. Has had some improvement     #Raynaud's  -Has had some improvement since starting amlodipine, has noted some LE edema although has not been bothersome enough to stop the amlodipine     #H/O hand numbness with " positive phalen's test  -Offered to order EMG when is more bothersome      #Tobacco use  -Discussed decreasing/quitting, still working on decreasing use over time     #Rhinorrhea  -Continue flonase and loratadine.      #Health maintenance  -Colonoscopy 3/2025, repeat 10 years   -Mammogram 3/2025  -Follows with GYN  -Received Covid vaccine and booster  -Previously recommended Pneumovax, will consider  -Recommended shingrix     RTC 6 months      This note was dictated by speech recognition. Minor errors in transcription may be present

## 2025-12-09 ENCOUNTER — APPOINTMENT (OUTPATIENT)
Dept: PRIMARY CARE | Facility: CLINIC | Age: 70
End: 2025-12-09
Payer: MEDICARE